# Patient Record
Sex: FEMALE | Race: WHITE | NOT HISPANIC OR LATINO | Employment: OTHER | URBAN - METROPOLITAN AREA
[De-identification: names, ages, dates, MRNs, and addresses within clinical notes are randomized per-mention and may not be internally consistent; named-entity substitution may affect disease eponyms.]

---

## 2018-01-05 ENCOUNTER — APPOINTMENT (OUTPATIENT)
Dept: WOMENS IMAGING | Facility: HOSPITAL | Age: 63
End: 2018-01-05

## 2018-01-05 PROCEDURE — 77067 SCR MAMMO BI INCL CAD: CPT | Performed by: RADIOLOGY

## 2018-01-05 PROCEDURE — 77063 BREAST TOMOSYNTHESIS BI: CPT | Performed by: RADIOLOGY

## 2019-01-11 ENCOUNTER — APPOINTMENT (OUTPATIENT)
Dept: WOMENS IMAGING | Facility: HOSPITAL | Age: 64
End: 2019-01-11

## 2019-01-11 PROCEDURE — 77063 BREAST TOMOSYNTHESIS BI: CPT | Performed by: RADIOLOGY

## 2019-01-11 PROCEDURE — 77067 SCR MAMMO BI INCL CAD: CPT | Performed by: RADIOLOGY

## 2020-01-17 ENCOUNTER — APPOINTMENT (OUTPATIENT)
Dept: WOMENS IMAGING | Facility: HOSPITAL | Age: 65
End: 2020-01-17

## 2020-01-17 PROCEDURE — 77067 SCR MAMMO BI INCL CAD: CPT | Performed by: RADIOLOGY

## 2020-01-17 PROCEDURE — 77063 BREAST TOMOSYNTHESIS BI: CPT | Performed by: RADIOLOGY

## 2020-02-04 ENCOUNTER — APPOINTMENT (OUTPATIENT)
Dept: WOMENS IMAGING | Facility: HOSPITAL | Age: 65
End: 2020-02-04

## 2020-02-04 PROCEDURE — 76641 ULTRASOUND BREAST COMPLETE: CPT | Performed by: RADIOLOGY

## 2020-02-04 PROCEDURE — 77061 BREAST TOMOSYNTHESIS UNI: CPT | Performed by: RADIOLOGY

## 2020-02-04 PROCEDURE — 77065 DX MAMMO INCL CAD UNI: CPT | Performed by: RADIOLOGY

## 2020-02-04 PROCEDURE — G0279 TOMOSYNTHESIS, MAMMO: HCPCS | Performed by: RADIOLOGY

## 2020-08-03 ENCOUNTER — APPOINTMENT (OUTPATIENT)
Dept: WOMENS IMAGING | Facility: HOSPITAL | Age: 65
End: 2020-08-03

## 2020-08-03 PROCEDURE — 77065 DX MAMMO INCL CAD UNI: CPT | Performed by: RADIOLOGY

## 2020-08-03 PROCEDURE — 76641 ULTRASOUND BREAST COMPLETE: CPT | Performed by: RADIOLOGY

## 2020-08-03 PROCEDURE — G0279 TOMOSYNTHESIS, MAMMO: HCPCS | Performed by: RADIOLOGY

## 2020-08-03 PROCEDURE — 77061 BREAST TOMOSYNTHESIS UNI: CPT | Performed by: RADIOLOGY

## 2021-02-23 ENCOUNTER — APPOINTMENT (OUTPATIENT)
Dept: WOMENS IMAGING | Facility: HOSPITAL | Age: 66
End: 2021-02-23

## 2021-02-23 PROCEDURE — 77066 DX MAMMO INCL CAD BI: CPT | Performed by: RADIOLOGY

## 2021-02-23 PROCEDURE — 77062 BREAST TOMOSYNTHESIS BI: CPT | Performed by: RADIOLOGY

## 2021-02-23 PROCEDURE — G0279 TOMOSYNTHESIS, MAMMO: HCPCS | Performed by: RADIOLOGY

## 2021-05-10 ENCOUNTER — APPOINTMENT (OUTPATIENT)
Dept: WOMENS IMAGING | Facility: HOSPITAL | Age: 66
End: 2021-05-10

## 2021-05-10 PROCEDURE — 77080 DXA BONE DENSITY AXIAL: CPT | Performed by: RADIOLOGY

## 2022-04-19 ENCOUNTER — APPOINTMENT (OUTPATIENT)
Dept: WOMENS IMAGING | Facility: HOSPITAL | Age: 67
End: 2022-04-19

## 2022-04-19 PROCEDURE — 77063 BREAST TOMOSYNTHESIS BI: CPT | Performed by: RADIOLOGY

## 2022-04-19 PROCEDURE — 77067 SCR MAMMO BI INCL CAD: CPT | Performed by: RADIOLOGY

## 2022-09-30 ENCOUNTER — PRE-ADMISSION TESTING (OUTPATIENT)
Dept: PREADMISSION TESTING | Facility: HOSPITAL | Age: 67
End: 2022-09-30

## 2022-09-30 VITALS
SYSTOLIC BLOOD PRESSURE: 139 MMHG | OXYGEN SATURATION: 99 % | RESPIRATION RATE: 16 BRPM | TEMPERATURE: 98.3 F | DIASTOLIC BLOOD PRESSURE: 68 MMHG | HEIGHT: 63 IN | WEIGHT: 167 LBS | BODY MASS INDEX: 29.59 KG/M2 | HEART RATE: 69 BPM

## 2022-09-30 LAB
ANION GAP SERPL CALCULATED.3IONS-SCNC: 10.6 MMOL/L (ref 5–15)
BUN SERPL-MCNC: 20 MG/DL (ref 8–23)
BUN/CREAT SERPL: 26.7 (ref 7–25)
CALCIUM SPEC-SCNC: 9.6 MG/DL (ref 8.6–10.5)
CHLORIDE SERPL-SCNC: 102 MMOL/L (ref 98–107)
CO2 SERPL-SCNC: 25.4 MMOL/L (ref 22–29)
CREAT SERPL-MCNC: 0.75 MG/DL (ref 0.57–1)
DEPRECATED RDW RBC AUTO: 39.9 FL (ref 37–54)
EGFRCR SERPLBLD CKD-EPI 2021: 87.9 ML/MIN/1.73
ERYTHROCYTE [DISTWIDTH] IN BLOOD BY AUTOMATED COUNT: 12.4 % (ref 12.3–15.4)
GLUCOSE SERPL-MCNC: 160 MG/DL (ref 65–99)
HCT VFR BLD AUTO: 40.4 % (ref 34–46.6)
HGB BLD-MCNC: 13.4 G/DL (ref 12–15.9)
MCH RBC QN AUTO: 29.1 PG (ref 26.6–33)
MCHC RBC AUTO-ENTMCNC: 33.2 G/DL (ref 31.5–35.7)
MCV RBC AUTO: 87.8 FL (ref 79–97)
PLATELET # BLD AUTO: 253 10*3/MM3 (ref 140–450)
PMV BLD AUTO: 10.7 FL (ref 6–12)
POTASSIUM SERPL-SCNC: 4.3 MMOL/L (ref 3.5–5.2)
RBC # BLD AUTO: 4.6 10*6/MM3 (ref 3.77–5.28)
SODIUM SERPL-SCNC: 138 MMOL/L (ref 136–145)
WBC NRBC COR # BLD: 7.78 10*3/MM3 (ref 3.4–10.8)

## 2022-09-30 PROCEDURE — 36415 COLL VENOUS BLD VENIPUNCTURE: CPT

## 2022-09-30 PROCEDURE — 80048 BASIC METABOLIC PNL TOTAL CA: CPT

## 2022-09-30 PROCEDURE — 85027 COMPLETE CBC AUTOMATED: CPT

## 2022-09-30 RX ORDER — MELOXICAM 15 MG/1
15 TABLET ORAL DAILY
COMMUNITY
End: 2022-10-04 | Stop reason: HOSPADM

## 2022-10-03 ENCOUNTER — ANESTHESIA (OUTPATIENT)
Dept: PERIOP | Facility: HOSPITAL | Age: 67
End: 2022-10-03

## 2022-10-03 ENCOUNTER — ANESTHESIA EVENT (OUTPATIENT)
Dept: PERIOP | Facility: HOSPITAL | Age: 67
End: 2022-10-03

## 2022-10-03 ENCOUNTER — HOSPITAL ENCOUNTER (OUTPATIENT)
Facility: HOSPITAL | Age: 67
Discharge: HOME | End: 2022-10-04
Attending: SURGERY | Admitting: SURGERY

## 2022-10-03 DIAGNOSIS — G89.18 POSTOPERATIVE PAIN: Primary | ICD-10-CM

## 2022-10-03 DIAGNOSIS — N62 MACROMASTIA: ICD-10-CM

## 2022-10-03 PROCEDURE — 25010000002 ROPIVACAINE PER 1 MG: Performed by: SURGERY

## 2022-10-03 PROCEDURE — 25010000002 METHYLPREDNISOLONE PER 125 MG: Performed by: SURGERY

## 2022-10-03 PROCEDURE — 25010000002 PROPOFOL 10 MG/ML EMULSION: Performed by: NURSE ANESTHETIST, CERTIFIED REGISTERED

## 2022-10-03 PROCEDURE — 25010000002 MAGNESIUM SULFATE PER 500 MG OF MAGNESIUM: Performed by: NURSE ANESTHETIST, CERTIFIED REGISTERED

## 2022-10-03 PROCEDURE — 0 BUPIVACAINE LIPOSOME 1.3 % SUSPENSION: Performed by: SURGERY

## 2022-10-03 PROCEDURE — C9290 INJ, BUPIVACAINE LIPOSOME: HCPCS | Performed by: SURGERY

## 2022-10-03 PROCEDURE — 25010000002 CEFAZOLIN PER 500 MG: Performed by: SURGERY

## 2022-10-03 PROCEDURE — 25010000002 FENTANYL CITRATE (PF) 100 MCG/2ML SOLUTION: Performed by: NURSE ANESTHETIST, CERTIFIED REGISTERED

## 2022-10-03 PROCEDURE — 88305 TISSUE EXAM BY PATHOLOGIST: CPT | Performed by: SURGERY

## 2022-10-03 PROCEDURE — 25010000002 ONDANSETRON PER 1 MG: Performed by: ANESTHESIOLOGY

## 2022-10-03 PROCEDURE — 25010000002 HYDROMORPHONE 1 MG/ML SOLUTION: Performed by: ANESTHESIOLOGY

## 2022-10-03 PROCEDURE — 25010000002 PROPOFOL 200 MG/20ML EMULSION: Performed by: NURSE ANESTHETIST, CERTIFIED REGISTERED

## 2022-10-03 PROCEDURE — 25010000002 NEOSTIGMINE 5 MG/10ML SOLUTION: Performed by: ANESTHESIOLOGY

## 2022-10-03 RX ORDER — FENTANYL CITRATE 50 UG/ML
50 INJECTION, SOLUTION INTRAMUSCULAR; INTRAVENOUS
Status: DISCONTINUED | OUTPATIENT
Start: 2022-10-03 | End: 2022-10-03 | Stop reason: HOSPADM

## 2022-10-03 RX ORDER — PROPOFOL 10 MG/ML
INJECTION, EMULSION INTRAVENOUS AS NEEDED
Status: DISCONTINUED | OUTPATIENT
Start: 2022-10-03 | End: 2022-10-03 | Stop reason: SURG

## 2022-10-03 RX ORDER — HYDRALAZINE HYDROCHLORIDE 20 MG/ML
5 INJECTION INTRAMUSCULAR; INTRAVENOUS
Status: DISCONTINUED | OUTPATIENT
Start: 2022-10-03 | End: 2022-10-03 | Stop reason: HOSPADM

## 2022-10-03 RX ORDER — SODIUM CHLORIDE 0.9 % (FLUSH) 0.9 %
3 SYRINGE (ML) INJECTION EVERY 12 HOURS SCHEDULED
Status: DISCONTINUED | OUTPATIENT
Start: 2022-10-03 | End: 2022-10-03 | Stop reason: HOSPADM

## 2022-10-03 RX ORDER — LIDOCAINE HYDROCHLORIDE 10 MG/ML
0.5 INJECTION, SOLUTION EPIDURAL; INFILTRATION; INTRACAUDAL; PERINEURAL ONCE AS NEEDED
Status: DISCONTINUED | OUTPATIENT
Start: 2022-10-03 | End: 2022-10-03 | Stop reason: HOSPADM

## 2022-10-03 RX ORDER — ONDANSETRON 2 MG/ML
4 INJECTION INTRAMUSCULAR; INTRAVENOUS EVERY 6 HOURS PRN
Status: DISCONTINUED | OUTPATIENT
Start: 2022-10-03 | End: 2022-10-04 | Stop reason: HOSPADM

## 2022-10-03 RX ORDER — OXYCODONE HYDROCHLORIDE AND ACETAMINOPHEN 5; 325 MG/1; MG/1
1 TABLET ORAL EVERY 4 HOURS PRN
Status: DISCONTINUED | OUTPATIENT
Start: 2022-10-03 | End: 2022-10-04 | Stop reason: HOSPADM

## 2022-10-03 RX ORDER — DOCUSATE SODIUM 100 MG/1
100 CAPSULE, LIQUID FILLED ORAL 2 TIMES DAILY
Status: DISCONTINUED | OUTPATIENT
Start: 2022-10-03 | End: 2022-10-04 | Stop reason: HOSPADM

## 2022-10-03 RX ORDER — HYDROMORPHONE HYDROCHLORIDE 1 MG/ML
0.25 INJECTION, SOLUTION INTRAMUSCULAR; INTRAVENOUS; SUBCUTANEOUS
Status: DISCONTINUED | OUTPATIENT
Start: 2022-10-03 | End: 2022-10-04 | Stop reason: HOSPADM

## 2022-10-03 RX ORDER — HYDROCODONE BITARTRATE AND ACETAMINOPHEN 7.5; 325 MG/1; MG/1
1 TABLET ORAL ONCE AS NEEDED
Status: DISCONTINUED | OUTPATIENT
Start: 2022-10-03 | End: 2022-10-03 | Stop reason: HOSPADM

## 2022-10-03 RX ORDER — SACCHAROMYCES BOULARDII 250 MG
500 CAPSULE ORAL 2 TIMES DAILY
Status: DISCONTINUED | OUTPATIENT
Start: 2022-10-03 | End: 2022-10-04 | Stop reason: HOSPADM

## 2022-10-03 RX ORDER — SODIUM CHLORIDE 0.9 % (FLUSH) 0.9 %
3-10 SYRINGE (ML) INJECTION AS NEEDED
Status: DISCONTINUED | OUTPATIENT
Start: 2022-10-03 | End: 2022-10-03 | Stop reason: HOSPADM

## 2022-10-03 RX ORDER — ROPIVACAINE HYDROCHLORIDE 5 MG/ML
INJECTION, SOLUTION EPIDURAL; INFILTRATION; PERINEURAL AS NEEDED
Status: DISCONTINUED | OUTPATIENT
Start: 2022-10-03 | End: 2022-10-03 | Stop reason: HOSPADM

## 2022-10-03 RX ORDER — NALOXONE HCL 0.4 MG/ML
0.2 VIAL (ML) INJECTION AS NEEDED
Status: DISCONTINUED | OUTPATIENT
Start: 2022-10-03 | End: 2022-10-03 | Stop reason: HOSPADM

## 2022-10-03 RX ORDER — CLINDAMYCIN PHOSPHATE 900 MG/50ML
900 INJECTION INTRAVENOUS ONCE
Status: COMPLETED | OUTPATIENT
Start: 2022-10-03 | End: 2022-10-03

## 2022-10-03 RX ORDER — OXYCODONE AND ACETAMINOPHEN 10; 325 MG/1; MG/1
1 TABLET ORAL EVERY 4 HOURS PRN
Status: DISCONTINUED | OUTPATIENT
Start: 2022-10-03 | End: 2022-10-04 | Stop reason: HOSPADM

## 2022-10-03 RX ORDER — ROCURONIUM BROMIDE 10 MG/ML
INJECTION, SOLUTION INTRAVENOUS AS NEEDED
Status: DISCONTINUED | OUTPATIENT
Start: 2022-10-03 | End: 2022-10-03 | Stop reason: SURG

## 2022-10-03 RX ORDER — FAMOTIDINE 10 MG/ML
20 INJECTION, SOLUTION INTRAVENOUS ONCE
Status: COMPLETED | OUTPATIENT
Start: 2022-10-03 | End: 2022-10-03

## 2022-10-03 RX ORDER — CELECOXIB 200 MG/1
400 CAPSULE ORAL DAILY
Status: DISCONTINUED | OUTPATIENT
Start: 2022-10-04 | End: 2022-10-04 | Stop reason: HOSPADM

## 2022-10-03 RX ORDER — FLUMAZENIL 0.1 MG/ML
0.2 INJECTION INTRAVENOUS AS NEEDED
Status: DISCONTINUED | OUTPATIENT
Start: 2022-10-03 | End: 2022-10-03 | Stop reason: HOSPADM

## 2022-10-03 RX ORDER — EPHEDRINE SULFATE 50 MG/ML
5 INJECTION, SOLUTION INTRAVENOUS ONCE AS NEEDED
Status: DISCONTINUED | OUTPATIENT
Start: 2022-10-03 | End: 2022-10-03 | Stop reason: HOSPADM

## 2022-10-03 RX ORDER — SODIUM CHLORIDE, SODIUM LACTATE, POTASSIUM CHLORIDE, CALCIUM CHLORIDE 600; 310; 30; 20 MG/100ML; MG/100ML; MG/100ML; MG/100ML
9 INJECTION, SOLUTION INTRAVENOUS CONTINUOUS
Status: DISCONTINUED | OUTPATIENT
Start: 2022-10-03 | End: 2022-10-04 | Stop reason: HOSPADM

## 2022-10-03 RX ORDER — POLYETHYLENE GLYCOL 3350 17 G/17G
17 POWDER, FOR SOLUTION ORAL DAILY
Status: DISCONTINUED | OUTPATIENT
Start: 2022-10-03 | End: 2022-10-04 | Stop reason: HOSPADM

## 2022-10-03 RX ORDER — ACETAMINOPHEN 325 MG/1
975 TABLET ORAL ONCE
Status: COMPLETED | OUTPATIENT
Start: 2022-10-03 | End: 2022-10-03

## 2022-10-03 RX ORDER — HYDROXYZINE PAMOATE 50 MG/1
50 CAPSULE ORAL ONCE
Status: COMPLETED | OUTPATIENT
Start: 2022-10-03 | End: 2022-10-03

## 2022-10-03 RX ORDER — OXYCODONE AND ACETAMINOPHEN 7.5; 325 MG/1; MG/1
1 TABLET ORAL EVERY 4 HOURS PRN
Status: DISCONTINUED | OUTPATIENT
Start: 2022-10-03 | End: 2022-10-03 | Stop reason: HOSPADM

## 2022-10-03 RX ORDER — OXYCODONE HYDROCHLORIDE AND ACETAMINOPHEN 5; 325 MG/1; MG/1
1 TABLET ORAL ONCE AS NEEDED
Status: DISCONTINUED | OUTPATIENT
Start: 2022-10-03 | End: 2022-10-03 | Stop reason: HOSPADM

## 2022-10-03 RX ORDER — CYCLOBENZAPRINE HCL 10 MG
5 TABLET ORAL 3 TIMES DAILY PRN
Status: DISCONTINUED | OUTPATIENT
Start: 2022-10-03 | End: 2022-10-04 | Stop reason: HOSPADM

## 2022-10-03 RX ORDER — CELECOXIB 200 MG/1
400 CAPSULE ORAL ONCE
Status: COMPLETED | OUTPATIENT
Start: 2022-10-03 | End: 2022-10-03

## 2022-10-03 RX ORDER — ONDANSETRON 2 MG/ML
4 INJECTION INTRAMUSCULAR; INTRAVENOUS ONCE AS NEEDED
Status: DISCONTINUED | OUTPATIENT
Start: 2022-10-03 | End: 2022-10-03 | Stop reason: HOSPADM

## 2022-10-03 RX ORDER — NEOSTIGMINE METHYLSULFATE 0.5 MG/ML
INJECTION, SOLUTION INTRAVENOUS AS NEEDED
Status: DISCONTINUED | OUTPATIENT
Start: 2022-10-03 | End: 2022-10-03 | Stop reason: SURG

## 2022-10-03 RX ORDER — NALOXONE HCL 0.4 MG/ML
0.1 VIAL (ML) INJECTION
Status: DISCONTINUED | OUTPATIENT
Start: 2022-10-03 | End: 2022-10-04 | Stop reason: HOSPADM

## 2022-10-03 RX ORDER — ONDANSETRON 2 MG/ML
INJECTION INTRAMUSCULAR; INTRAVENOUS AS NEEDED
Status: DISCONTINUED | OUTPATIENT
Start: 2022-10-03 | End: 2022-10-03 | Stop reason: SURG

## 2022-10-03 RX ORDER — CYCLOBENZAPRINE HCL 10 MG
5 TABLET ORAL ONCE
Status: COMPLETED | OUTPATIENT
Start: 2022-10-03 | End: 2022-10-03

## 2022-10-03 RX ORDER — PROMETHAZINE HYDROCHLORIDE 12.5 MG/1
12.5 TABLET ORAL EVERY 6 HOURS PRN
Status: DISCONTINUED | OUTPATIENT
Start: 2022-10-03 | End: 2022-10-04 | Stop reason: HOSPADM

## 2022-10-03 RX ORDER — CYCLOBENZAPRINE HCL 10 MG
10 TABLET ORAL ONCE
Status: COMPLETED | OUTPATIENT
Start: 2022-10-03 | End: 2022-10-03

## 2022-10-03 RX ORDER — GLYCOPYRROLATE 0.2 MG/ML
INJECTION INTRAMUSCULAR; INTRAVENOUS AS NEEDED
Status: DISCONTINUED | OUTPATIENT
Start: 2022-10-03 | End: 2022-10-03 | Stop reason: SURG

## 2022-10-03 RX ORDER — HYDROXYZINE PAMOATE 50 MG/1
50 CAPSULE ORAL 4 TIMES DAILY PRN
Status: DISCONTINUED | OUTPATIENT
Start: 2022-10-03 | End: 2022-10-04 | Stop reason: HOSPADM

## 2022-10-03 RX ORDER — LIDOCAINE HYDROCHLORIDE 20 MG/ML
INJECTION, SOLUTION EPIDURAL; INFILTRATION; INTRACAUDAL; PERINEURAL AS NEEDED
Status: DISCONTINUED | OUTPATIENT
Start: 2022-10-03 | End: 2022-10-03 | Stop reason: SURG

## 2022-10-03 RX ORDER — MAGNESIUM SULFATE HEPTAHYDRATE 500 MG/ML
INJECTION, SOLUTION INTRAMUSCULAR; INTRAVENOUS AS NEEDED
Status: DISCONTINUED | OUTPATIENT
Start: 2022-10-03 | End: 2022-10-03 | Stop reason: SURG

## 2022-10-03 RX ORDER — SCOLOPAMINE TRANSDERMAL SYSTEM 1 MG/1
1 PATCH, EXTENDED RELEASE TRANSDERMAL ONCE
Status: DISCONTINUED | OUTPATIENT
Start: 2022-10-03 | End: 2022-10-03

## 2022-10-03 RX ORDER — CELECOXIB 200 MG/1
200 CAPSULE ORAL ONCE
Status: COMPLETED | OUTPATIENT
Start: 2022-10-03 | End: 2022-10-03

## 2022-10-03 RX ORDER — ACETAMINOPHEN 500 MG
1000 TABLET ORAL EVERY 6 HOURS PRN
Status: DISCONTINUED | OUTPATIENT
Start: 2022-10-03 | End: 2022-10-04 | Stop reason: HOSPADM

## 2022-10-03 RX ORDER — SODIUM CHLORIDE, SODIUM LACTATE, POTASSIUM CHLORIDE, CALCIUM CHLORIDE 600; 310; 30; 20 MG/100ML; MG/100ML; MG/100ML; MG/100ML
100 INJECTION, SOLUTION INTRAVENOUS CONTINUOUS
Status: DISCONTINUED | OUTPATIENT
Start: 2022-10-03 | End: 2022-10-04 | Stop reason: HOSPADM

## 2022-10-03 RX ORDER — ACETAMINOPHEN 500 MG
1000 TABLET ORAL EVERY 6 HOURS PRN
COMMUNITY

## 2022-10-03 RX ORDER — HYDROMORPHONE HYDROCHLORIDE 1 MG/ML
0.5 INJECTION, SOLUTION INTRAMUSCULAR; INTRAVENOUS; SUBCUTANEOUS
Status: DISCONTINUED | OUTPATIENT
Start: 2022-10-03 | End: 2022-10-03 | Stop reason: HOSPADM

## 2022-10-03 RX ORDER — MIDAZOLAM HYDROCHLORIDE 1 MG/ML
0.5 INJECTION INTRAMUSCULAR; INTRAVENOUS
Status: DISCONTINUED | OUTPATIENT
Start: 2022-10-03 | End: 2022-10-03 | Stop reason: HOSPADM

## 2022-10-03 RX ORDER — LABETALOL HYDROCHLORIDE 5 MG/ML
5 INJECTION, SOLUTION INTRAVENOUS
Status: DISCONTINUED | OUTPATIENT
Start: 2022-10-03 | End: 2022-10-03 | Stop reason: HOSPADM

## 2022-10-03 RX ORDER — FENTANYL CITRATE 50 UG/ML
INJECTION, SOLUTION INTRAMUSCULAR; INTRAVENOUS AS NEEDED
Status: DISCONTINUED | OUTPATIENT
Start: 2022-10-03 | End: 2022-10-03 | Stop reason: SURG

## 2022-10-03 RX ORDER — ONDANSETRON 4 MG/1
4 TABLET, FILM COATED ORAL EVERY 6 HOURS PRN
Status: DISCONTINUED | OUTPATIENT
Start: 2022-10-03 | End: 2022-10-04 | Stop reason: HOSPADM

## 2022-10-03 RX ADMIN — SODIUM CHLORIDE, POTASSIUM CHLORIDE, SODIUM LACTATE AND CALCIUM CHLORIDE: 600; 310; 30; 20 INJECTION, SOLUTION INTRAVENOUS at 16:05

## 2022-10-03 RX ADMIN — SODIUM CHLORIDE 250 MG: 9 INJECTION, SOLUTION INTRAVENOUS at 11:27

## 2022-10-03 RX ADMIN — HYDROXYZINE PAMOATE 50 MG: 50 CAPSULE ORAL at 11:24

## 2022-10-03 RX ADMIN — NEOSTIGMINE METHYLSULFATE 2.5 MG: 0.5 INJECTION INTRAVENOUS at 15:45

## 2022-10-03 RX ADMIN — CYCLOBENZAPRINE 5 MG: 10 TABLET, FILM COATED ORAL at 16:57

## 2022-10-03 RX ADMIN — CYCLOBENZAPRINE 10 MG: 10 TABLET, FILM COATED ORAL at 11:24

## 2022-10-03 RX ADMIN — ACETAMINOPHEN 975 MG: 325 TABLET, FILM COATED ORAL at 11:24

## 2022-10-03 RX ADMIN — FENTANYL CITRATE 50 MCG: 50 INJECTION, SOLUTION INTRAMUSCULAR; INTRAVENOUS at 14:39

## 2022-10-03 RX ADMIN — LIDOCAINE HYDROCHLORIDE 60 MG: 20 INJECTION, SOLUTION EPIDURAL; INFILTRATION; INTRACAUDAL; PERINEURAL at 12:28

## 2022-10-03 RX ADMIN — FENTANYL CITRATE 50 MCG: 50 INJECTION, SOLUTION INTRAMUSCULAR; INTRAVENOUS at 12:24

## 2022-10-03 RX ADMIN — MAGNESIUM SULFATE HEPTAHYDRATE 2 G: 500 INJECTION, SOLUTION INTRAMUSCULAR; INTRAVENOUS at 12:33

## 2022-10-03 RX ADMIN — SCOPALAMINE 1 PATCH: 1 PATCH, EXTENDED RELEASE TRANSDERMAL at 11:24

## 2022-10-03 RX ADMIN — CLINDAMYCIN PHOSPHATE 900 MG: 900 INJECTION, SOLUTION INTRAVENOUS at 12:17

## 2022-10-03 RX ADMIN — FAMOTIDINE 20 MG: 10 INJECTION INTRAVENOUS at 11:24

## 2022-10-03 RX ADMIN — DOCUSATE SODIUM 100 MG: 100 CAPSULE, LIQUID FILLED ORAL at 21:12

## 2022-10-03 RX ADMIN — GLYCOPYRROLATE 0.2 MG: 1 INJECTION INTRAMUSCULAR; INTRAVENOUS at 13:41

## 2022-10-03 RX ADMIN — CELECOXIB 200 MG: 200 CAPSULE ORAL at 16:57

## 2022-10-03 RX ADMIN — PROPOFOL 25 MCG/KG/MIN: 10 INJECTION, EMULSION INTRAVENOUS at 12:44

## 2022-10-03 RX ADMIN — CELECOXIB 400 MG: 200 CAPSULE ORAL at 11:24

## 2022-10-03 RX ADMIN — PROPOFOL 200 MG: 10 INJECTION, EMULSION INTRAVENOUS at 12:28

## 2022-10-03 RX ADMIN — SODIUM CHLORIDE, POTASSIUM CHLORIDE, SODIUM LACTATE AND CALCIUM CHLORIDE: 600; 310; 30; 20 INJECTION, SOLUTION INTRAVENOUS at 13:43

## 2022-10-03 RX ADMIN — ONDANSETRON 4 MG: 2 INJECTION INTRAMUSCULAR; INTRAVENOUS at 15:48

## 2022-10-03 RX ADMIN — SODIUM CHLORIDE, POTASSIUM CHLORIDE, SODIUM LACTATE AND CALCIUM CHLORIDE 9 ML/HR: 600; 310; 30; 20 INJECTION, SOLUTION INTRAVENOUS at 11:15

## 2022-10-03 RX ADMIN — HYDROMORPHONE HYDROCHLORIDE 0.5 MG: 1 INJECTION, SOLUTION INTRAMUSCULAR; INTRAVENOUS; SUBCUTANEOUS at 15:52

## 2022-10-03 RX ADMIN — FENTANYL CITRATE 50 MCG: 50 INJECTION, SOLUTION INTRAMUSCULAR; INTRAVENOUS at 14:27

## 2022-10-03 RX ADMIN — GLYCOPYRROLATE 0.4 MG: 1 INJECTION INTRAMUSCULAR; INTRAVENOUS at 15:46

## 2022-10-03 RX ADMIN — ROCURONIUM BROMIDE 50 MG: 10 INJECTION, SOLUTION INTRAVENOUS at 12:28

## 2022-10-03 RX ADMIN — FENTANYL CITRATE 50 MCG: 50 INJECTION, SOLUTION INTRAMUSCULAR; INTRAVENOUS at 12:47

## 2022-10-03 NOTE — ANESTHESIA POSTPROCEDURE EVALUATION
Patient: Lesli Ambrocio    Procedure Summary     Date: 10/03/22 Room / Location: [facility] OR 03 / [facility] MAIN OR    Anesthesia Start: 1222 Anesthesia Stop: 1629    Procedure: BILATERAL BREAST REDUCTION (Bilateral Chest) Diagnosis:     Surgeons: CHARLEE Day MD Provider: Lauro Ramos MD    Anesthesia Type: general ASA Status: 2          Anesthesia Type: general    Vitals  Vitals Value Taken Time   /73 10/03/22 1701   Temp 36.6 °C (97.9 °F) 10/03/22 1626   Pulse 78 10/03/22 1714   Resp 16 10/03/22 1700   SpO2 97 % 10/03/22 1714   Vitals shown include unvalidated device data.        Post Anesthesia Care and Evaluation    Patient location during evaluation: bedside  Patient participation: complete - patient participated  Level of consciousness: awake and alert  Pain management: adequate    Airway patency: patent  Anesthetic complications: No anesthetic complications  PONV Status: controlled  Cardiovascular status: blood pressure returned to baseline and acceptable  Respiratory status: acceptable  Hydration status: acceptable

## 2022-10-03 NOTE — OP NOTE
Preoperative diagnosis: Bilateral macromastia  Postoperative diagnosis: Same  Procedure performed bilateral reduction mammoplasty  Surgeon: Frank Day MD  Anesthesia: General endotracheal  Estimated blood loss 50 cc  Drains x2  Complications none apparent  Indications for procedure: This patient has severe back neck and shoulder pain she is required operative intervention because of previous back pain and she has extremely large breast with macromastia which are aggravating her back neck and shoulder pain she gets intertriginous rashes shoulder grooving back neck and shoulder pain and aggravation of her back pain.  She desires reduction mammoplasty we have discussed the risk benefits and complication she is marked prior to surgery with a modified Wise pattern and an inferior central pedicle is planned she understands I cannot guarantee a particular cup size and she agrees to proceed.  Procedure: The patient's brought the operating room placed operative table supine position.  Satisfactory general tracheal anesthesia achieved without difficulty.  We injected dilute local anesthesia about the periphery of both breast lines worked several minutes she was prepped and draped sterilely and we de-epithelialized around 42 mm nipple areolar complexes and de-epithelialized inferior central pedicles.  We then elevated thick superior skin flaps are modified Wise pattern and then resected breast tissue from medially superiorly and laterally the smaller right breast resection weight was 609 g the larger left breast resection weight was 757 g excellent hemostasis was maintained with Bovie cauterization we irrigated with antibiotic containing saline we cauterized all bleeding points thoroughly placed 15 Macedonian Tre drain on each side and secured with a nylon suture we confirmed excellent hemostasis, nipple areolar circulation was excellent with the nipples being maintained on an inferior central pedicle.  We brought the  incisions together with temporary surgical staples bringing the nipple areolar complex out through a 40 mm cut out we then removed all of the staples and replaced them with deep dermal 4-0 Vicryl's throughout and then a 4-0 PDS subcuticular suture along the inframammary crease and a 5-0 PDS subcuticular suture around the nipple and vertically.  We applied Exofin skin adhesive to the skin incisions allowed this to dry needle and sponge counts were correct x2 we placed gauze at the drain exit sites nitroglycerin paste at the T juncture and nipples she tolerated the procedure well we applied ABD pads and a light tube top and a gently compressive 6 inch Ace wrap was applied for gentle compression.  She went to the recovery area in satisfactory and stable condition needle and sponge counts were correct x2.  Specimens were sent to pathology permanent specimen

## 2022-10-03 NOTE — ANESTHESIA PROCEDURE NOTES
Airway  Urgency: elective    Date/Time: 10/3/2022 12:31 PM  Airway not difficult    General Information and Staff    Patient location during procedure: OR  Anesthesiologist: Lauro Ramos MD  CRNA/CAA: Carmelita Raygoza CRNA    Indications and Patient Condition  Indications for airway management: airway protection    Preoxygenated: yes  MILS maintained throughout  Mask difficulty assessment: 2 - vent by mask + OA or adjuvant +/- NMBA    Final Airway Details  Final airway type: endotracheal airway      Successful airway: ETT  Cuffed: yes   Successful intubation technique: direct laryngoscopy  Endotracheal tube insertion site: oral  Blade: Maher  Blade size: 2  ETT size (mm): 7.0  Cormack-Lehane Classification: grade I - full view of glottis  Placement verified by: chest auscultation and capnometry   Cuff volume (mL): 8  Measured from: lips  ETT/EBT  to lips (cm): 21  Number of attempts at approach: 1  Assessment: lips, teeth, and gum same as pre-op and atraumatic intubation    Additional Comments  Pt preoxygenated, SIVI, bag mask vent, ATETI, dentition as before

## 2022-10-03 NOTE — ANESTHESIA PREPROCEDURE EVALUATION
Anesthesia Evaluation     Patient summary reviewed and Nursing notes reviewed   history of anesthetic complications: PONV  NPO Solid Status: > 8 hours  NPO Liquid Status: > 4 hours           Airway   Mallampati: II  Dental - normal exam     Pulmonary - negative pulmonary ROS and normal exam   Cardiovascular - negative cardio ROS and normal exam        Neuro/Psych- negative ROS  GI/Hepatic/Renal/Endo - negative ROS     Musculoskeletal     Abdominal    Substance History      OB/GYN          Other   arthritis,                      Anesthesia Plan    ASA 2     general     intravenous induction     Anesthetic plan, risks, benefits, and alternatives have been provided, discussed and informed consent has been obtained with: patient.        CODE STATUS:

## 2022-10-04 VITALS
SYSTOLIC BLOOD PRESSURE: 120 MMHG | TEMPERATURE: 97.3 F | OXYGEN SATURATION: 94 % | WEIGHT: 166.89 LBS | DIASTOLIC BLOOD PRESSURE: 63 MMHG | HEART RATE: 68 BPM | RESPIRATION RATE: 16 BRPM | HEIGHT: 63 IN | BODY MASS INDEX: 29.57 KG/M2

## 2022-10-04 PROBLEM — N62 MACROMASTIA: Status: RESOLVED | Noted: 2022-10-03 | Resolved: 2022-10-04

## 2022-10-04 RX ORDER — CYCLOBENZAPRINE HCL 5 MG
TABLET ORAL
Qty: 30 TABLET | Refills: 0 | Status: SHIPPED | OUTPATIENT
Start: 2022-10-04

## 2022-10-04 RX ORDER — PROMETHAZINE HYDROCHLORIDE 12.5 MG/1
12.5 TABLET ORAL EVERY 6 HOURS PRN
Qty: 15 TABLET | Refills: 0 | Status: SHIPPED | OUTPATIENT
Start: 2022-10-04

## 2022-10-04 RX ORDER — OXYCODONE AND ACETAMINOPHEN 10; 325 MG/1; MG/1
TABLET ORAL
Qty: 20 TABLET | Refills: 0 | Status: SHIPPED | OUTPATIENT
Start: 2022-10-04

## 2022-10-04 RX ADMIN — SODIUM CHLORIDE, POTASSIUM CHLORIDE, SODIUM LACTATE AND CALCIUM CHLORIDE 100 ML/HR: 600; 310; 30; 20 INJECTION, SOLUTION INTRAVENOUS at 05:00

## 2022-10-04 RX ADMIN — DOCUSATE SODIUM 100 MG: 100 CAPSULE, LIQUID FILLED ORAL at 08:04

## 2022-10-04 RX ADMIN — POLYETHYLENE GLYCOL 3350 17 G: 17 POWDER, FOR SOLUTION ORAL at 08:04

## 2022-10-04 RX ADMIN — CELECOXIB 400 MG: 200 CAPSULE ORAL at 08:04

## 2022-10-04 RX ADMIN — NITROGLYCERIN 1 INCH: 20 OINTMENT TOPICAL at 05:52

## 2022-10-04 RX ADMIN — Medication 500 MG: at 00:51

## 2022-10-04 RX ADMIN — Medication 500 MG: at 08:04

## 2022-10-04 RX ADMIN — NITROGLYCERIN 1 INCH: 20 OINTMENT TOPICAL at 00:51

## 2022-10-04 NOTE — NURSING NOTE
MAGDALENOS. ALIYA drains x 2. Drain care teaching done. Reviewed Dr. Rueda discharge instructions. Discharged home today.

## 2022-10-04 NOTE — PLAN OF CARE
Problem: Adult Inpatient Plan of Care  Goal: Plan of Care Review  Outcome: Ongoing, Progressing  Flowsheets (Taken 10/4/2022 2451)  Plan of Care Reviewed With: patient  Outcome Evaluation: received from PACU awake and alert, pain 0, incisions and dressings CDI, ace wrap removed as ordred, ALIYA x2 with small serous sanguinous drain, drain care teaching initiated, voiding, ambulated, pain level remained minimal, did not ask for any pain meds, )2 at 2 L during the night and D/C'd this am, VSS, going home today   Goal Outcome Evaluation:  Plan of Care Reviewed With: patient           Outcome Evaluation: received from PACU awake and alert, pain 0, incisions and dressings CDI, ace wrap removed as ordred, ALIYA x2 with small serous sanguinous drain, drain care teaching initiated, voiding, ambulated, pain level remained minimal, did not ask for any pain meds, )2 at 2 L during the night and D/C'd this am, VSS, going home today

## 2022-10-04 NOTE — CASE MANAGEMENT/SOCIAL WORK
Case Management Discharge Note      Final Note: Discharge to home. JOSTIN CAMPBELL CCP         Selected Continued Care - Admitted Since 10/3/2022     Destination    No services have been selected for the patient.              Durable Medical Equipment    No services have been selected for the patient.              Dialysis/Infusion    No services have been selected for the patient.              Home Medical Care    No services have been selected for the patient.              Therapy    No services have been selected for the patient.              Community Resources    No services have been selected for the patient.              Community & DME    No services have been selected for the patient.                  Transportation Services  Private: Car    Final Discharge Disposition Code: 01 - home or self-care

## 2022-10-04 NOTE — DISCHARGE SUMMARY
Assessment & Plan  Patient is postoperative day #1 from a bilateral reduction mammoplasty for a diagnosis of severe symptomatic macromastia by Frank Day MD.  She has done well and is ready for discharge home.  Her diet is regular, her activities are limited with a 5 pound weight limit she is to keep her arms and hands lower than the top of her head.  No vigorous exercise is allowed for 3 weeks.  She has been instructed in drain care her discharge medications include Percocet Flexeril and Phenergan.  She will require greater than 3 days of opioids because of major bilateral breast surgery.  Her follow-up appointment will be with Dr. Day this Friday.  I have reviewed my reduction mammoplasty discharge instructions with the patient they are scanned into epic.  She is to call or text me if she has any problems or concerns she has contact information for the office as well as my cell.  She is stable at discharge all precautions have been followed regarding the pandemic.    Subjective Patient is postoperative day #1 from a bilateral reduction mammoplasty for a diagnosis of severe symptomatic macromastia.  She has done well overnight she is voiding without difficulties she is in minimal pain tolerating oral intake    Temp:  [96.9 °F (36.1 °C)-97.9 °F (36.6 °C)] 96.9 °F (36.1 °C)  Heart Rate:  [60-90] 60  Resp:  [14-18] 18  BP: (115-166)/(60-94) 117/63  I/O last 3 completed shifts:  In: 3336.7 [P.O.:340; I.V.:2946.7; IV Piggyback:50]  Out: 3562 [Urine:3450; Drains:62; Blood:50]  No intake/output data recorded.    Objective Operative sites look good skin edges mild bruising no evidence of circulatory compromise breast are soft no evidence of collection or hematoma nipple areolar circulation is excellent drainage is minimal calves are soft and nontender    * No active hospital problems. *

## 2022-10-05 LAB
LAB AP CASE REPORT: NORMAL
PATH REPORT.FINAL DX SPEC: NORMAL
PATH REPORT.GROSS SPEC: NORMAL

## 2022-10-06 NOTE — H&P
Assessment & Plan This patient has severe symptomatic macromastia we plan bilateral reduction mammoplasty I discussed the risk benefits and complications with her she is reviewed the informed consent from the [employer] in my office and is ready to proceed she is marked prior to surgery with a modified Stout pattern with an inferior central pedicle planned.  I have answered all of her questions she is very ready to proceed.  She understands I cannot guarantee a particular cup size.    Subjective This patient's chief complaint is severe symptomatic macromastia  History of present illness this patient has long suffered with severe symptomatic macromastia she is generally healthy but does have arthritis and has had some hand surgery as well as a disc fusion by Dr. Lauro Osuna she takes occasional Aleve she has been fully vaccinated for COVID she is a non-smoker and is not exposed to secondhand smoke.  She has tried acupuncture physical therapy hot packs cold packs supportive bras nonsteroidal anti-inflammatory drugs she is tried to avoid opioids because they make her sick she complains of severe grooving in her shoulders and has very little soft tissue between the skin and soft tissue of her shoulders and the bones she has had an AC separation in the past she has chronic and severe low back pain mid back pain and upper back pain worsening over the last 20 years it does interfere with exercise  Medications see epic  Allergies no known drug allergies  Social nonsmoker       I/O last 3 completed shifts:  In: 3336.7 [P.O.:340; I.V.:2946.7; IV Piggyback:50]  Out: 3562 [Urine:3450; Drains:62; Blood:50]  I/O this shift:  In: 444.4 [I.V.:444.4]  Out: 515 [Urine:500; Drains:15]    Objective Physical exam vital signs please see epic  Chest is clear  Heart regular rate without murmurs rubs or gallops  Breast are extremely large without any dominant masses left breast larger than right sternal notch to  nipple distance is 33 on the right 36 cm on the left nipple to crease distance is 19 cm on both sides we would estimate removal of somewhere between 600 to 1200 g of tissue per breast  Extremities no cyanosis clubbing edema calves soft and nontender      * No active hospital problems. *

## 2023-04-21 ENCOUNTER — APPOINTMENT (OUTPATIENT)
Dept: WOMENS IMAGING | Facility: HOSPITAL | Age: 68
End: 2023-04-21
Payer: MEDICARE

## 2023-04-21 PROCEDURE — 77067 SCR MAMMO BI INCL CAD: CPT | Performed by: RADIOLOGY

## 2023-04-21 PROCEDURE — 77063 BREAST TOMOSYNTHESIS BI: CPT | Performed by: RADIOLOGY

## 2023-07-17 ENCOUNTER — HOSPITAL ENCOUNTER (OUTPATIENT)
Facility: HOSPITAL | Age: 68
Setting detail: HOSPITAL OUTPATIENT SURGERY
Discharge: HOME | End: 2023-07-17
Attending: SURGERY | Admitting: SURGERY
Payer: MEDICARE

## 2023-07-17 VITALS
HEART RATE: 91 BPM | TEMPERATURE: 97.6 F | OXYGEN SATURATION: 95 % | SYSTOLIC BLOOD PRESSURE: 162 MMHG | DIASTOLIC BLOOD PRESSURE: 71 MMHG | RESPIRATION RATE: 16 BRPM

## 2023-07-17 PROCEDURE — 0 CLINDAMYCIN 900 MG/50ML SOLUTION: Performed by: SURGERY

## 2023-07-17 PROCEDURE — 25010000002 METHYLPREDNISOLONE PER 125 MG: Performed by: SURGERY

## 2023-07-17 RX ORDER — LIDOCAINE HYDROCHLORIDE 10 MG/ML
0.5 INJECTION, SOLUTION EPIDURAL; INFILTRATION; INTRACAUDAL; PERINEURAL ONCE AS NEEDED
Status: DISCONTINUED | OUTPATIENT
Start: 2023-07-17 | End: 2023-07-17 | Stop reason: HOSPADM

## 2023-07-17 RX ORDER — FLUMAZENIL 0.1 MG/ML
0.2 INJECTION INTRAVENOUS AS NEEDED
Status: DISCONTINUED | OUTPATIENT
Start: 2023-07-17 | End: 2023-07-17 | Stop reason: HOSPADM

## 2023-07-17 RX ORDER — SODIUM CHLORIDE, SODIUM LACTATE, POTASSIUM CHLORIDE, CALCIUM CHLORIDE 600; 310; 30; 20 MG/100ML; MG/100ML; MG/100ML; MG/100ML
9 INJECTION, SOLUTION INTRAVENOUS CONTINUOUS
Status: DISCONTINUED | OUTPATIENT
Start: 2023-07-17 | End: 2023-07-17 | Stop reason: HOSPADM

## 2023-07-17 RX ORDER — HYDROMORPHONE HYDROCHLORIDE 1 MG/ML
0.25 INJECTION, SOLUTION INTRAMUSCULAR; INTRAVENOUS; SUBCUTANEOUS
Status: DISCONTINUED | OUTPATIENT
Start: 2023-07-17 | End: 2023-07-17 | Stop reason: HOSPADM

## 2023-07-17 RX ORDER — NALOXONE HCL 0.4 MG/ML
0.2 VIAL (ML) INJECTION AS NEEDED
Status: DISCONTINUED | OUTPATIENT
Start: 2023-07-17 | End: 2023-07-17 | Stop reason: HOSPADM

## 2023-07-17 RX ORDER — PROMETHAZINE HYDROCHLORIDE 25 MG/1
25 TABLET ORAL ONCE AS NEEDED
Status: DISCONTINUED | OUTPATIENT
Start: 2023-07-17 | End: 2023-07-17 | Stop reason: HOSPADM

## 2023-07-17 RX ORDER — CELECOXIB 200 MG/1
400 CAPSULE ORAL ONCE
Status: COMPLETED | OUTPATIENT
Start: 2023-07-17 | End: 2023-07-17

## 2023-07-17 RX ORDER — SCOLOPAMINE TRANSDERMAL SYSTEM 1 MG/1
1 PATCH, EXTENDED RELEASE TRANSDERMAL ONCE
Status: DISCONTINUED | OUTPATIENT
Start: 2023-07-17 | End: 2023-07-17 | Stop reason: HOSPADM

## 2023-07-17 RX ORDER — CLINDAMYCIN PHOSPHATE 900 MG/50ML
900 INJECTION INTRAVENOUS ONCE
Status: COMPLETED | OUTPATIENT
Start: 2023-07-17 | End: 2023-07-17

## 2023-07-17 RX ORDER — ERYTHROMYCIN 5 MG/G
OINTMENT OPHTHALMIC AS NEEDED
Status: DISCONTINUED | OUTPATIENT
Start: 2023-07-17 | End: 2023-07-17 | Stop reason: HOSPADM

## 2023-07-17 RX ORDER — ACETAMINOPHEN 325 MG/1
975 TABLET ORAL ONCE
Status: COMPLETED | OUTPATIENT
Start: 2023-07-17 | End: 2023-07-17

## 2023-07-17 RX ORDER — FENTANYL CITRATE 50 UG/ML
25 INJECTION, SOLUTION INTRAMUSCULAR; INTRAVENOUS
Status: DISCONTINUED | OUTPATIENT
Start: 2023-07-17 | End: 2023-07-17 | Stop reason: HOSPADM

## 2023-07-17 RX ORDER — MAGNESIUM HYDROXIDE 1200 MG/15ML
LIQUID ORAL AS NEEDED
Status: DISCONTINUED | OUTPATIENT
Start: 2023-07-17 | End: 2023-07-17 | Stop reason: HOSPADM

## 2023-07-17 RX ORDER — ONDANSETRON 4 MG/1
4 TABLET, FILM COATED ORAL ONCE AS NEEDED
Status: DISCONTINUED | OUTPATIENT
Start: 2023-07-17 | End: 2023-07-17 | Stop reason: HOSPADM

## 2023-07-17 RX ORDER — HYDRALAZINE HYDROCHLORIDE 20 MG/ML
5 INJECTION INTRAMUSCULAR; INTRAVENOUS
Status: DISCONTINUED | OUTPATIENT
Start: 2023-07-17 | End: 2023-07-17 | Stop reason: HOSPADM

## 2023-07-17 RX ORDER — DROPERIDOL 2.5 MG/ML
0.62 INJECTION, SOLUTION INTRAMUSCULAR; INTRAVENOUS
Status: DISCONTINUED | OUTPATIENT
Start: 2023-07-17 | End: 2023-07-17 | Stop reason: HOSPADM

## 2023-07-17 RX ORDER — HYDROCODONE BITARTRATE AND ACETAMINOPHEN 5; 325 MG/1; MG/1
1 TABLET ORAL ONCE AS NEEDED
Status: DISCONTINUED | OUTPATIENT
Start: 2023-07-17 | End: 2023-07-17 | Stop reason: HOSPADM

## 2023-07-17 RX ORDER — ONDANSETRON 2 MG/ML
4 INJECTION INTRAMUSCULAR; INTRAVENOUS ONCE AS NEEDED
Status: DISCONTINUED | OUTPATIENT
Start: 2023-07-17 | End: 2023-07-17 | Stop reason: HOSPADM

## 2023-07-17 RX ORDER — FENTANYL CITRATE 50 UG/ML
50 INJECTION, SOLUTION INTRAMUSCULAR; INTRAVENOUS
Status: DISCONTINUED | OUTPATIENT
Start: 2023-07-17 | End: 2023-07-17 | Stop reason: HOSPADM

## 2023-07-17 RX ORDER — MIDAZOLAM HYDROCHLORIDE 1 MG/ML
0.5 INJECTION INTRAMUSCULAR; INTRAVENOUS
Status: DISCONTINUED | OUTPATIENT
Start: 2023-07-17 | End: 2023-07-17 | Stop reason: HOSPADM

## 2023-07-17 RX ORDER — BALANCED SALT SOLUTION 6.4; .75; .48; .3; 3.9; 1.7 MG/ML; MG/ML; MG/ML; MG/ML; MG/ML; MG/ML
SOLUTION OPHTHALMIC AS NEEDED
Status: DISCONTINUED | OUTPATIENT
Start: 2023-07-17 | End: 2023-07-17 | Stop reason: HOSPADM

## 2023-07-17 RX ORDER — FAMOTIDINE 10 MG/ML
20 INJECTION, SOLUTION INTRAVENOUS ONCE
Status: COMPLETED | OUTPATIENT
Start: 2023-07-17 | End: 2023-07-17

## 2023-07-17 RX ORDER — OXYCODONE HYDROCHLORIDE AND ACETAMINOPHEN 5; 325 MG/1; MG/1
1 TABLET ORAL ONCE AS NEEDED
Status: DISCONTINUED | OUTPATIENT
Start: 2023-07-17 | End: 2023-07-17 | Stop reason: HOSPADM

## 2023-07-17 RX ORDER — DOCUSATE SODIUM 100 MG/1
100 CAPSULE, LIQUID FILLED ORAL ONCE
Status: COMPLETED | OUTPATIENT
Start: 2023-07-17 | End: 2023-07-17

## 2023-07-17 RX ORDER — IPRATROPIUM BROMIDE AND ALBUTEROL SULFATE 2.5; .5 MG/3ML; MG/3ML
3 SOLUTION RESPIRATORY (INHALATION) ONCE AS NEEDED
Status: DISCONTINUED | OUTPATIENT
Start: 2023-07-17 | End: 2023-07-17 | Stop reason: HOSPADM

## 2023-07-17 RX ORDER — OXYCODONE AND ACETAMINOPHEN 10; 325 MG/1; MG/1
1 TABLET ORAL EVERY 6 HOURS PRN
Qty: 15 TABLET | Refills: 0 | Status: SHIPPED | OUTPATIENT
Start: 2023-07-17

## 2023-07-17 RX ORDER — EPHEDRINE SULFATE 50 MG/ML
5 INJECTION, SOLUTION INTRAVENOUS ONCE AS NEEDED
Status: DISCONTINUED | OUTPATIENT
Start: 2023-07-17 | End: 2023-07-17 | Stop reason: HOSPADM

## 2023-07-17 RX ORDER — SODIUM CHLORIDE 0.9 % (FLUSH) 0.9 %
3-10 SYRINGE (ML) INJECTION AS NEEDED
Status: DISCONTINUED | OUTPATIENT
Start: 2023-07-17 | End: 2023-07-17 | Stop reason: HOSPADM

## 2023-07-17 RX ORDER — PROMETHAZINE HYDROCHLORIDE 25 MG/1
25 TABLET ORAL EVERY 6 HOURS PRN
Qty: 15 TABLET | Refills: 0 | Status: SHIPPED | OUTPATIENT
Start: 2023-07-17

## 2023-07-17 RX ORDER — HYDROCODONE BITARTRATE AND ACETAMINOPHEN 7.5; 325 MG/1; MG/1
1 TABLET ORAL EVERY 4 HOURS PRN
Status: DISCONTINUED | OUTPATIENT
Start: 2023-07-17 | End: 2023-07-17 | Stop reason: HOSPADM

## 2023-07-17 RX ORDER — DIPHENHYDRAMINE HYDROCHLORIDE 50 MG/ML
12.5 INJECTION INTRAMUSCULAR; INTRAVENOUS
Status: DISCONTINUED | OUTPATIENT
Start: 2023-07-17 | End: 2023-07-17 | Stop reason: HOSPADM

## 2023-07-17 RX ORDER — PROMETHAZINE HYDROCHLORIDE 25 MG/1
12.5 TABLET ORAL ONCE AS NEEDED
Status: DISCONTINUED | OUTPATIENT
Start: 2023-07-17 | End: 2023-07-17 | Stop reason: HOSPADM

## 2023-07-17 RX ORDER — PROMETHAZINE HYDROCHLORIDE 25 MG/1
25 SUPPOSITORY RECTAL ONCE AS NEEDED
Status: DISCONTINUED | OUTPATIENT
Start: 2023-07-17 | End: 2023-07-17 | Stop reason: HOSPADM

## 2023-07-17 RX ORDER — SODIUM CHLORIDE 0.9 % (FLUSH) 0.9 %
3 SYRINGE (ML) INJECTION EVERY 12 HOURS SCHEDULED
Status: DISCONTINUED | OUTPATIENT
Start: 2023-07-17 | End: 2023-07-17 | Stop reason: HOSPADM

## 2023-07-17 RX ORDER — HYDROXYZINE PAMOATE 50 MG/1
50 CAPSULE ORAL ONCE
Status: COMPLETED | OUTPATIENT
Start: 2023-07-17 | End: 2023-07-17

## 2023-07-17 RX ORDER — LABETALOL HYDROCHLORIDE 5 MG/ML
5 INJECTION, SOLUTION INTRAVENOUS
Status: DISCONTINUED | OUTPATIENT
Start: 2023-07-17 | End: 2023-07-17 | Stop reason: HOSPADM

## 2023-07-17 RX ADMIN — SCOPALAMINE 1 PATCH: 1 PATCH, EXTENDED RELEASE TRANSDERMAL at 12:12

## 2023-07-17 RX ADMIN — DOCUSATE SODIUM 100 MG: 100 CAPSULE, LIQUID FILLED ORAL at 14:21

## 2023-07-17 RX ADMIN — CELECOXIB 400 MG: 200 CAPSULE ORAL at 12:12

## 2023-07-17 RX ADMIN — ACETAMINOPHEN 975 MG: 325 TABLET, FILM COATED ORAL at 12:12

## 2023-07-17 RX ADMIN — CLINDAMYCIN PHOSPHATE 900 MG: 900 INJECTION, SOLUTION INTRAVENOUS at 12:31

## 2023-07-17 RX ADMIN — FAMOTIDINE 20 MG: 10 INJECTION INTRAVENOUS at 12:26

## 2023-07-17 RX ADMIN — HYDROXYZINE PAMOATE 50 MG: 50 CAPSULE ORAL at 12:12

## 2023-07-17 RX ADMIN — SODIUM CHLORIDE, POTASSIUM CHLORIDE, SODIUM LACTATE AND CALCIUM CHLORIDE 9 ML/HR: 600; 310; 30; 20 INJECTION, SOLUTION INTRAVENOUS at 12:28

## 2023-07-17 NOTE — OP NOTE
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  We plan bilateral upper lid blepharoplasty she is reviewed the informed consent from the [employer] and is ready to proceed she is marked prior to surgery.  Procedure: Patient's brought the operating room placed operatively supine position with the head elevated satisfactory general laryngeal mask anesthesia was achieved without difficulty we injected 1% lidocaine 1 100,000 epinephrine 7 cc mixed with 3 cc of Marcaine with epinephrine quarter percent this was allowed to work for full 15 minutes while she was prepped prepped and draped in a sterile fashion we placed corneal scleral shields in with Lacri-Lube on each side carefully we then resected the skin per our preoperative markings taking some of the orbicularis muscularis just beneath it we open the orbital septum medially and removed just a bit of fat on each side obtained excellent hemostasis with the needle tip Bovie we closed the most lateral aspect of our incision with a 5-0 Monocryl laterally we then used 5-0 Prolene subcuticular pullout suture we also placed a few 5-0 fast-absorbing gut laterally we secured our suture ends on the pullout sutures with Steri-Strips and Mastisol we removed the scleral shields irrigated with BSS solution applied erythromycin ophthalmic to our incisions and then a cool  moistened 4 x 4 gauze applied to the upper lids and then an ice pack she tolerated procedure well needle and sponge counts were correct x2 and she went to recovery in satisfactory condition.

## 2023-09-12 ENCOUNTER — TELEPHONE (OUTPATIENT)
Dept: SURGERY | Facility: CLINIC | Age: 68
End: 2023-09-12

## 2023-09-12 NOTE — TELEPHONE ENCOUNTER
[facility] received a fax that requires your attention. The document has been indexed to the patient’s chart for your review.      Reason for sending:  [facility] PHYSICIAN LETTER, REQUIRES PROVIDERS REVIEW.     Documents Description: PHYSICIAN LETTER_TAMMIE ADAMS MD_09-12-23    Name of Sender: TAMMIE ADAMS MD    Date Indexed: 09/12/23    Notes (if needed):

## 2024-01-29 ENCOUNTER — PREP FOR SURGERY (OUTPATIENT)
Dept: OTHER | Facility: HOSPITAL | Age: 69
End: 2024-01-29
Payer: MEDICARE

## 2024-01-29 DIAGNOSIS — Z80.0 FAMILY HISTORY OF COLON CANCER: Primary | ICD-10-CM

## 2024-02-19 ENCOUNTER — TELEPHONE (OUTPATIENT)
Dept: SURGERY | Facility: CLINIC | Age: 69
End: 2024-02-19
Payer: MEDICARE

## 2024-02-19 NOTE — TELEPHONE ENCOUNTER
Returned patient's call regarding scheduling her direct colonoscopy. She had questions regarding her last scope at [facility]. I have sent Dr. Young a message and will wait to hear back from him.

## 2024-02-22 PROBLEM — Z80.0 FAMILY HISTORY OF COLON CANCER: Status: ACTIVE | Noted: 2024-01-29

## 2024-05-22 ENCOUNTER — HOSPITAL ENCOUNTER (OUTPATIENT)
Dept: PET IMAGING | Facility: HOSPITAL | Age: 69
Discharge: HOME | End: 2024-05-22
Payer: MEDICARE

## 2024-05-22 ENCOUNTER — TRANSCRIBE ORDERS (OUTPATIENT)
Dept: BONE DENSITY | Facility: HOSPITAL | Age: 69
End: 2024-05-22
Payer: MEDICARE

## 2024-05-22 ENCOUNTER — APPOINTMENT (OUTPATIENT)
Dept: WOMENS IMAGING | Facility: HOSPITAL | Age: 69
End: 2024-05-22
Payer: MEDICARE

## 2024-05-22 DIAGNOSIS — Z78.0 POSTMENOPAUSAL STATUS (AGE-RELATED) (NATURAL): Primary | ICD-10-CM

## 2024-05-22 PROCEDURE — 77063 BREAST TOMOSYNTHESIS BI: CPT | Performed by: RADIOLOGY

## 2024-05-22 PROCEDURE — 77067 SCR MAMMO BI INCL CAD: CPT | Performed by: RADIOLOGY

## 2024-05-22 PROCEDURE — 77080 DXA BONE DENSITY AXIAL: CPT

## 2024-05-28 ENCOUNTER — ANESTHESIA EVENT (OUTPATIENT)
Dept: GASTROENTEROLOGY | Facility: HOSPITAL | Age: 69
End: 2024-05-28
Payer: MEDICARE

## 2024-05-28 RX ORDER — MELOXICAM 15 MG/1
15 TABLET ORAL DAILY
COMMUNITY
Start: 2024-03-18

## 2024-05-28 RX ORDER — MELATONIN
1000 DAILY
COMMUNITY

## 2024-05-29 ENCOUNTER — ANESTHESIA (OUTPATIENT)
Dept: GASTROENTEROLOGY | Facility: HOSPITAL | Age: 69
End: 2024-05-29
Payer: MEDICARE

## 2024-05-29 ENCOUNTER — HOSPITAL ENCOUNTER (OUTPATIENT)
Facility: HOSPITAL | Age: 69
Setting detail: HOSPITAL OUTPATIENT SURGERY
Discharge: HOME | End: 2024-05-29
Attending: SURGERY | Admitting: SURGERY
Payer: MEDICARE

## 2024-05-29 VITALS
SYSTOLIC BLOOD PRESSURE: 150 MMHG | RESPIRATION RATE: 13 BRPM | HEIGHT: 63 IN | BODY MASS INDEX: 27.14 KG/M2 | OXYGEN SATURATION: 100 % | HEART RATE: 57 BPM | DIASTOLIC BLOOD PRESSURE: 74 MMHG | WEIGHT: 153.2 LBS

## 2024-05-29 PROCEDURE — 25010000002 PROPOFOL 1000 MG/100ML EMULSION

## 2024-05-29 PROCEDURE — 25810000003 LACTATED RINGERS PER 1000 ML: Performed by: SURGERY

## 2024-05-29 PROCEDURE — 25010000002 PROPOFOL 200 MG/20ML EMULSION

## 2024-05-29 RX ORDER — PROPOFOL 10 MG/ML
INJECTION, EMULSION INTRAVENOUS AS NEEDED
Status: DISCONTINUED | OUTPATIENT
Start: 2024-05-29 | End: 2024-05-29 | Stop reason: SURG

## 2024-05-29 RX ORDER — SODIUM CHLORIDE, SODIUM LACTATE, POTASSIUM CHLORIDE, CALCIUM CHLORIDE 600; 310; 30; 20 MG/100ML; MG/100ML; MG/100ML; MG/100ML
30 INJECTION, SOLUTION INTRAVENOUS CONTINUOUS PRN
Status: DISCONTINUED | OUTPATIENT
Start: 2024-05-29 | End: 2024-05-29 | Stop reason: HOSPADM

## 2024-05-29 RX ORDER — PROPOFOL 10 MG/ML
INJECTION, EMULSION INTRAVENOUS CONTINUOUS PRN
Status: DISCONTINUED | OUTPATIENT
Start: 2024-05-29 | End: 2024-05-29 | Stop reason: SURG

## 2024-05-29 RX ORDER — LIDOCAINE HYDROCHLORIDE 20 MG/ML
INJECTION, SOLUTION INFILTRATION; PERINEURAL AS NEEDED
Status: DISCONTINUED | OUTPATIENT
Start: 2024-05-29 | End: 2024-05-29 | Stop reason: SURG

## 2024-05-29 RX ADMIN — PROPOFOL INJECTABLE EMULSION 50 MG: 10 INJECTION, EMULSION INTRAVENOUS at 07:20

## 2024-05-29 RX ADMIN — PROPOFOL INJECTABLE EMULSION 100 MG: 10 INJECTION, EMULSION INTRAVENOUS at 07:19

## 2024-05-29 RX ADMIN — SODIUM CHLORIDE, POTASSIUM CHLORIDE, SODIUM LACTATE AND CALCIUM CHLORIDE 30 ML/HR: 600; 310; 30; 20 INJECTION, SOLUTION INTRAVENOUS at 06:42

## 2024-05-29 RX ADMIN — PROPOFOL 200 MCG/KG/MIN: 10 INJECTION, EMULSION INTRAVENOUS at 07:19

## 2024-05-29 RX ADMIN — LIDOCAINE HYDROCHLORIDE 60 MG: 20 INJECTION, SOLUTION INFILTRATION; PERINEURAL at 07:19

## 2024-05-29 NOTE — H&P
CC: Family history of colon cancer    HPI: 69-year-old lady with family history of colon cancer in mother presents for surveillance colonoscopy.  Her last colonoscopy was a little bit more than 5 years ago and was unsuccessful, followed with barium enema.    PMH, PSH, MEDS AND ALLERGIES reviewed and reconciled in  EPIC    PHYSICAL EXAM:  Constitutional:  awake, alert, no acute distress  VS: afebrile, VSS  Respiratory:  normal inspiratory effort  Cardiovascular: regular rate  Gastrointestinal: Soft    ROS:  relevant systems negative other than any presenting complaints    ASSESSMENT:    Surveillance colonoscopy secondary to family history of colon cancer    PLAN:  Colonoscopy    Shar Young M.D.

## 2024-05-29 NOTE — DISCHARGE INSTRUCTIONS
For the next 24 hours patient needs to be with a responsible adult.    For 24 hours DO NOT drive, operate machinery, appliances, drink alcohol, make important decisions or sign legal documents.    Start with a light or bland diet if you are feeling sick to your stomach otherwise advance to regular diet as tolerated.    Follow recommendations on procedure report if provided by your doctor.    Call Dr Young for problems [phone]    Problems may include but not limited to: large amounts of bleeding, trouble breathing, repeated vomiting, severe unrelieved pain, fever or chills.

## 2024-05-29 NOTE — ANESTHESIA PREPROCEDURE EVALUATION
Anesthesia Evaluation     Patient summary reviewed and Nursing notes reviewed   history of anesthetic complications:  PONV               Airway   Mallampati: II  Dental      Pulmonary - negative pulmonary ROS   Cardiovascular - negative cardio ROS    ECG reviewed  Rhythm: regular  Rate: normal        Neuro/Psych- negative ROS  GI/Hepatic/Renal/Endo - negative ROS     Musculoskeletal     (+) back pain  Abdominal    Substance History - negative use     OB/GYN negative ob/gyn ROS         Other                    Anesthesia Plan    ASA 2     MAC     intravenous induction     Anesthetic plan, risks, benefits, and alternatives have been provided, discussed and informed consent has been obtained with: patient.    CODE STATUS:

## 2024-05-29 NOTE — ANESTHESIA POSTPROCEDURE EVALUATION
Patient: Lesli Ambrocio    Procedure Summary       Date: 05/29/24 Room / Location: [facility] ENDOSCOPY 1 / [facility] ENDOSCOPY    Anesthesia Start: 0716 Anesthesia Stop: 0749    Procedure: COLONOSCOPY to cecum Diagnosis:       Family history of colon cancer      (Family history of colon cancer [Z80.0])    Surgeons: Shar Young MD Provider: Sreedhar Diaz MD    Anesthesia Type: MAC ASA Status: 2            Anesthesia Type: MAC    Vitals  Vitals Value Taken Time   /52 05/29/24 0749   Temp     Pulse 69 05/29/24 0759   Resp 15 05/29/24 0748   SpO2 98 % 05/29/24 0759   Vitals shown include unfiled device data.        Post Anesthesia Care and Evaluation    Patient location during evaluation: PACU  Patient participation: complete - patient participated  Level of consciousness: awake and alert  Pain management: adequate    Airway patency: patent  Anesthetic complications: No anesthetic complications    Cardiovascular status: acceptable  Respiratory status: acceptable  Hydration status: acceptable    Comments: --------------------            05/29/24               0748     --------------------   BP:       112/52     Pulse:               Resp:       15       SpO2:               --------------------

## 2024-05-29 NOTE — OP NOTE
PREOPERATIVE DIAGNOSIS:  High risk screening secondary to family history of colon cancer-mother    POSTOPERATIVE DIAGNOSIS AND FINDINGS:  Diverticulosis    PROCEDURE:  Colonoscopy to cecum     SURGEON:  Shar Young MD    ANESTHESIA:  MAC    SPECIMEN(S):  none    DESCRIPTION:  In decubitus position digital rectal exam was normal. Colonoscope inserted under direct visualization of lumen to cecum confirmed by visualization of ileocecal valve and appendiceal orifice.  Scope was slowly withdrawn circumferentially examining all mucosal surfaces.  Bowel preparation was good.  There were no mucosal abnormalities.  Diverticulosis was present mostly in the sigmoid colon.  Tolerated well.    RECOMMENDATION FOR FUTURE SURVEILLANCE:  5 years    Shar Young M.D.

## 2024-09-13 ENCOUNTER — HOSPITAL ENCOUNTER (OUTPATIENT)
Dept: GENERAL RADIOLOGY | Facility: HOSPITAL | Age: 69
Discharge: HOME | End: 2024-09-13
Payer: MEDICARE

## 2024-09-13 ENCOUNTER — LAB (OUTPATIENT)
Dept: LAB | Facility: HOSPITAL | Age: 69
End: 2024-09-13
Payer: MEDICARE

## 2024-09-13 ENCOUNTER — HOSPITAL ENCOUNTER (OUTPATIENT)
Dept: CARDIOLOGY | Facility: HOSPITAL | Age: 69
Discharge: HOME | End: 2024-09-13
Payer: MEDICARE

## 2024-09-13 LAB
ABO GROUP BLD: NORMAL
ALBUMIN SERPL-MCNC: 4.1 G/DL (ref 3.5–5.2)
ALBUMIN/GLOB SERPL: 1.5 G/DL
ALP SERPL-CCNC: 73 U/L (ref 39–117)
ALT SERPL W P-5'-P-CCNC: 21 U/L (ref 1–33)
ANION GAP SERPL CALCULATED.3IONS-SCNC: 8.8 MMOL/L (ref 5–15)
APTT PPP: 27.6 SECONDS (ref 24–31)
AST SERPL-CCNC: 21 U/L (ref 1–32)
BILIRUB SERPL-MCNC: 0.3 MG/DL (ref 0–1.2)
BILIRUB UR QL STRIP: NEGATIVE
BLD GP AB SCN SERPL QL: NEGATIVE
BUN SERPL-MCNC: 17 MG/DL (ref 8–23)
BUN/CREAT SERPL: 23.6 (ref 7–25)
CALCIUM SPEC-SCNC: 9.4 MG/DL (ref 8.6–10.5)
CHLORIDE SERPL-SCNC: 102 MMOL/L (ref 98–107)
CLARITY UR: CLEAR
CO2 SERPL-SCNC: 25.2 MMOL/L (ref 22–29)
COLOR UR: YELLOW
CREAT SERPL-MCNC: 0.72 MG/DL (ref 0.57–1)
DEPRECATED RDW RBC AUTO: 44.9 FL (ref 37–54)
EGFRCR SERPLBLD CKD-EPI 2021: 91.2 ML/MIN/1.73
ERYTHROCYTE [DISTWIDTH] IN BLOOD BY AUTOMATED COUNT: 13 % (ref 12.3–15.4)
GLOBULIN UR ELPH-MCNC: 2.7 GM/DL
GLUCOSE SERPL-MCNC: 93 MG/DL (ref 65–99)
GLUCOSE UR STRIP-MCNC: NEGATIVE MG/DL
HBA1C MFR BLD: 5.7 % (ref 4.8–5.6)
HCT VFR BLD AUTO: 41.8 % (ref 34–46.6)
HGB BLD-MCNC: 13.4 G/DL (ref 12–15.9)
HGB UR QL STRIP.AUTO: NEGATIVE
HOLD SPECIMEN: NORMAL
INR PPP: 0.94 (ref 0.93–1.1)
KETONES UR QL STRIP: NEGATIVE
LEUKOCYTE ESTERASE UR QL STRIP.AUTO: NEGATIVE
MCH RBC QN AUTO: 30.2 PG (ref 26.6–33)
MCHC RBC AUTO-ENTMCNC: 32.1 G/DL (ref 31.5–35.7)
MCV RBC AUTO: 94.4 FL (ref 79–97)
MRSA DNA SPEC QL NAA+PROBE: NORMAL
NITRITE UR QL STRIP: NEGATIVE
PH UR STRIP.AUTO: 6 [PH] (ref 5–8)
PLATELET # BLD AUTO: 244 10*3/MM3 (ref 140–450)
PMV BLD AUTO: 10.3 FL (ref 6–12)
POTASSIUM SERPL-SCNC: 4.8 MMOL/L (ref 3.5–5.2)
PROT SERPL-MCNC: 6.8 G/DL (ref 6–8.5)
PROT UR QL STRIP: NEGATIVE
PROTHROMBIN TIME: 10.3 SECONDS (ref 9.6–11.7)
QT INTERVAL: 437 MS
QTC INTERVAL: 414 MS
RBC # BLD AUTO: 4.43 10*6/MM3 (ref 3.77–5.28)
RH BLD: POSITIVE
SODIUM SERPL-SCNC: 136 MMOL/L (ref 136–145)
SP GR UR STRIP: 1.01 (ref 1–1.03)
T&S EXPIRATION DATE: NORMAL
UROBILINOGEN UR QL STRIP: NORMAL
WBC NRBC COR # BLD AUTO: 7.52 10*3/MM3 (ref 3.4–10.8)

## 2024-09-13 PROCEDURE — 86900 BLOOD TYPING SEROLOGIC ABO: CPT

## 2024-09-13 PROCEDURE — 86850 RBC ANTIBODY SCREEN: CPT

## 2024-09-13 PROCEDURE — 93005 ELECTROCARDIOGRAM TRACING: CPT | Performed by: ORTHOPAEDIC SURGERY

## 2024-09-13 PROCEDURE — 85730 THROMBOPLASTIN TIME PARTIAL: CPT

## 2024-09-13 PROCEDURE — 71046 X-RAY EXAM CHEST 2 VIEWS: CPT

## 2024-09-13 PROCEDURE — 87641 MR-STAPH DNA AMP PROBE: CPT

## 2024-09-13 PROCEDURE — 85610 PROTHROMBIN TIME: CPT

## 2024-09-13 PROCEDURE — 86901 BLOOD TYPING SEROLOGIC RH(D): CPT

## 2024-09-13 PROCEDURE — 81003 URINALYSIS AUTO W/O SCOPE: CPT

## 2024-09-13 PROCEDURE — 80053 COMPREHEN METABOLIC PANEL: CPT

## 2024-09-13 PROCEDURE — 83036 HEMOGLOBIN GLYCOSYLATED A1C: CPT

## 2024-09-13 PROCEDURE — 85027 COMPLETE CBC AUTOMATED: CPT

## 2024-09-23 ENCOUNTER — ANESTHESIA EVENT (OUTPATIENT)
Dept: PERIOP | Facility: HOSPITAL | Age: 69
End: 2024-09-23
Payer: MEDICARE

## 2024-09-24 ENCOUNTER — APPOINTMENT (OUTPATIENT)
Dept: GENERAL RADIOLOGY | Facility: HOSPITAL | Age: 69
End: 2024-09-24
Payer: MEDICARE

## 2024-09-24 ENCOUNTER — ANESTHESIA (OUTPATIENT)
Dept: PERIOP | Facility: HOSPITAL | Age: 69
End: 2024-09-24
Payer: MEDICARE

## 2024-09-24 ENCOUNTER — HOSPITAL ENCOUNTER (INPATIENT)
Facility: HOSPITAL | Age: 69
LOS: 1 days | Discharge: HOME | End: 2024-09-25
Attending: ORTHOPAEDIC SURGERY | Admitting: ORTHOPAEDIC SURGERY
Payer: MEDICARE

## 2024-09-24 DIAGNOSIS — Z98.1 S/P LUMBAR FUSION: Primary | ICD-10-CM

## 2024-09-24 LAB
BASOPHILS # BLD AUTO: 0.01 10*3/MM3 (ref 0–0.2)
BASOPHILS NFR BLD AUTO: 0.1 % (ref 0–1.5)
DEPRECATED RDW RBC AUTO: 44.4 FL (ref 37–54)
EOSINOPHIL # BLD AUTO: 0 10*3/MM3 (ref 0–0.4)
EOSINOPHIL NFR BLD AUTO: 0 % (ref 0.3–6.2)
ERYTHROCYTE [DISTWIDTH] IN BLOOD BY AUTOMATED COUNT: 12.7 % (ref 12.3–15.4)
HCT VFR BLD AUTO: 31.3 % (ref 34–46.6)
HGB BLD-MCNC: 10.2 G/DL (ref 12–15.9)
IMM GRANULOCYTES # BLD AUTO: 0.04 10*3/MM3 (ref 0–0.05)
IMM GRANULOCYTES NFR BLD AUTO: 0.3 % (ref 0–0.5)
LYMPHOCYTES # BLD AUTO: 1.26 10*3/MM3 (ref 0.7–3.1)
LYMPHOCYTES NFR BLD AUTO: 9.9 % (ref 19.6–45.3)
MCH RBC QN AUTO: 31.1 PG (ref 26.6–33)
MCHC RBC AUTO-ENTMCNC: 32.6 G/DL (ref 31.5–35.7)
MCV RBC AUTO: 95.4 FL (ref 79–97)
MONOCYTES # BLD AUTO: 1.05 10*3/MM3 (ref 0.1–0.9)
MONOCYTES NFR BLD AUTO: 8.3 % (ref 5–12)
NEUTROPHILS NFR BLD AUTO: 10.33 10*3/MM3 (ref 1.7–7)
NEUTROPHILS NFR BLD AUTO: 81.4 % (ref 42.7–76)
NRBC BLD AUTO-RTO: 0 /100 WBC (ref 0–0.2)
PLATELET # BLD AUTO: 173 10*3/MM3 (ref 140–450)
PMV BLD AUTO: 10.2 FL (ref 6–12)
RBC # BLD AUTO: 3.28 10*6/MM3 (ref 3.77–5.28)
WBC NRBC COR # BLD AUTO: 12.69 10*3/MM3 (ref 3.4–10.8)

## 2024-09-24 PROCEDURE — 0QP004Z REMOVAL OF INTERNAL FIXATION DEVICE FROM LUMBAR VERTEBRA, OPEN APPROACH: ICD-10-PCS | Performed by: ORTHOPAEDIC SURGERY

## 2024-09-24 PROCEDURE — 25010000002 ONDANSETRON PER 1 MG: Performed by: NURSE ANESTHETIST, CERTIFIED REGISTERED

## 2024-09-24 PROCEDURE — 25810000003 LACTATED RINGERS PER 1000 ML: Performed by: ORTHOPAEDIC SURGERY

## 2024-09-24 PROCEDURE — C1713 ANCHOR/SCREW BN/BN,TIS/BN: HCPCS | Performed by: ORTHOPAEDIC SURGERY

## 2024-09-24 PROCEDURE — 85025 COMPLETE CBC W/AUTO DIFF WBC: CPT | Performed by: ORTHOPAEDIC SURGERY

## 2024-09-24 PROCEDURE — 25010000002 CEFAZOLIN PER 500 MG: Performed by: ORTHOPAEDIC SURGERY

## 2024-09-24 PROCEDURE — 0SG00AJ FUSION OF LUMBAR VERTEBRAL JOINT WITH INTERBODY FUSION DEVICE, POSTERIOR APPROACH, ANTERIOR COLUMN, OPEN APPROACH: ICD-10-PCS | Performed by: ORTHOPAEDIC SURGERY

## 2024-09-24 PROCEDURE — 25010000002 FENTANYL CITRATE (PF) 100 MCG/2ML SOLUTION: Performed by: NURSE ANESTHETIST, CERTIFIED REGISTERED

## 2024-09-24 PROCEDURE — 25010000002 HYDROMORPHONE 1 MG/ML SOLUTION: Performed by: NURSE ANESTHETIST, CERTIFIED REGISTERED

## 2024-09-24 PROCEDURE — 0SB20ZZ EXCISION OF LUMBAR VERTEBRAL DISC, OPEN APPROACH: ICD-10-PCS | Performed by: ORTHOPAEDIC SURGERY

## 2024-09-24 PROCEDURE — 25010000002 FENTANYL CITRATE (PF) 50 MCG/ML SOLUTION: Performed by: NURSE ANESTHETIST, CERTIFIED REGISTERED

## 2024-09-24 PROCEDURE — 25010000002 PHENYLEPHRINE 10 MG/ML SOLUTION: Performed by: NURSE ANESTHETIST, CERTIFIED REGISTERED

## 2024-09-24 PROCEDURE — 25010000002 SUGAMMADEX 200 MG/2ML SOLUTION: Performed by: NURSE ANESTHETIST, CERTIFIED REGISTERED

## 2024-09-24 PROCEDURE — 25010000002 MIDAZOLAM PER 1 MG: Performed by: NURSE ANESTHETIST, CERTIFIED REGISTERED

## 2024-09-24 PROCEDURE — 72100 X-RAY EXAM L-S SPINE 2/3 VWS: CPT

## 2024-09-24 PROCEDURE — 25010000002 CEFAZOLIN PER 500 MG: Performed by: NURSE ANESTHETIST, CERTIFIED REGISTERED

## 2024-09-24 PROCEDURE — 25810000003 SODIUM CHLORIDE 0.9 % SOLUTION: Performed by: ORTHOPAEDIC SURGERY

## 2024-09-24 PROCEDURE — 25010000002 DEXAMETHASONE PER 1 MG: Performed by: NURSE ANESTHETIST, CERTIFIED REGISTERED

## 2024-09-24 PROCEDURE — 25010000002 MAGNESIUM SULFATE PER 500 MG OF MAGNESIUM: Performed by: NURSE ANESTHETIST, CERTIFIED REGISTERED

## 2024-09-24 PROCEDURE — C1769 GUIDE WIRE: HCPCS | Performed by: ORTHOPAEDIC SURGERY

## 2024-09-24 PROCEDURE — 25010000002 BUPIVACAINE 0.5 % SOLUTION: Performed by: ORTHOPAEDIC SURGERY

## 2024-09-24 PROCEDURE — 25810000003 LACTATED RINGERS PER 1000 ML: Performed by: NURSE ANESTHETIST, CERTIFIED REGISTERED

## 2024-09-24 PROCEDURE — 01NB0ZZ RELEASE LUMBAR NERVE, OPEN APPROACH: ICD-10-PCS | Performed by: ORTHOPAEDIC SURGERY

## 2024-09-24 PROCEDURE — 80048 BASIC METABOLIC PNL TOTAL CA: CPT | Performed by: ORTHOPAEDIC SURGERY

## 2024-09-24 PROCEDURE — 25010000002 PROPOFOL 200 MG/20ML EMULSION: Performed by: NURSE ANESTHETIST, CERTIFIED REGISTERED

## 2024-09-24 DEVICE — FLOSEAL WITH RECOTHROM - 10ML.
Type: IMPLANTABLE DEVICE | Site: SPINE LUMBAR | Status: FUNCTIONAL
Brand: FLOSEAL HEMOSTATIC MATRIX

## 2024-09-24 DEVICE — ROD 651003040 5.5 CCM CAPPED ROD 40MM
Type: IMPLANTABLE DEVICE | Site: SPINE LUMBAR | Status: FUNCTIONAL
Brand: CD HORIZON® SOLERA® SPINAL SYSTEM

## 2024-09-24 DEVICE — ALLOGRFT DBM GRAFTON DS INJ FIBR 6CC: Type: IMPLANTABLE DEVICE | Site: SPINE LUMBAR | Status: FUNCTIONAL

## 2024-09-24 DEVICE — SPACER 6068076 CATALYFT PL LONG 7MM
Type: IMPLANTABLE DEVICE | Site: SPINE LUMBAR | Status: FUNCTIONAL
Brand: CATALYFT PL EXPANDABLE INTERBODY SYSTEM

## 2024-09-24 DEVICE — PUTTY DBM GRAFTON 6CC: Type: IMPLANTABLE DEVICE | Site: SPINE LUMBAR | Status: FUNCTIONAL

## 2024-09-24 DEVICE — FNS MAS 55850027550 CCM 7.5 X 50
Type: IMPLANTABLE DEVICE | Site: SPINE LUMBAR | Status: FUNCTIONAL
Brand: CD HORIZON® FENESTRATED SCREW SET

## 2024-09-24 RX ORDER — ACETAMINOPHEN 160 MG/5ML
650 SOLUTION ORAL EVERY 4 HOURS PRN
Status: DISCONTINUED | OUTPATIENT
Start: 2024-09-24 | End: 2024-09-25 | Stop reason: HOSPADM

## 2024-09-24 RX ORDER — LABETALOL HYDROCHLORIDE 5 MG/ML
5 INJECTION, SOLUTION INTRAVENOUS
Status: DISCONTINUED | OUTPATIENT
Start: 2024-09-24 | End: 2024-09-24 | Stop reason: HOSPADM

## 2024-09-24 RX ORDER — NALOXONE HCL 0.4 MG/ML
0.4 VIAL (ML) INJECTION AS NEEDED
Status: DISCONTINUED | OUTPATIENT
Start: 2024-09-24 | End: 2024-09-24 | Stop reason: HOSPADM

## 2024-09-24 RX ORDER — POLYETHYLENE GLYCOL 3350 17 G/17G
17 POWDER, FOR SOLUTION ORAL DAILY PRN
Status: DISCONTINUED | OUTPATIENT
Start: 2024-09-24 | End: 2024-09-25 | Stop reason: HOSPADM

## 2024-09-24 RX ORDER — ACETAMINOPHEN 325 MG/1
650 TABLET ORAL EVERY 4 HOURS PRN
Status: DISCONTINUED | OUTPATIENT
Start: 2024-09-24 | End: 2024-09-25 | Stop reason: HOSPADM

## 2024-09-24 RX ORDER — CELECOXIB 200 MG/1
200 CAPSULE ORAL ONCE
Status: COMPLETED | OUTPATIENT
Start: 2024-09-24 | End: 2024-09-24

## 2024-09-24 RX ORDER — PROCHLORPERAZINE EDISYLATE 5 MG/ML
10 INJECTION INTRAMUSCULAR; INTRAVENOUS ONCE AS NEEDED
Status: DISCONTINUED | OUTPATIENT
Start: 2024-09-24 | End: 2024-09-24 | Stop reason: HOSPADM

## 2024-09-24 RX ORDER — MEPERIDINE HYDROCHLORIDE 25 MG/ML
12.5 INJECTION INTRAMUSCULAR; INTRAVENOUS; SUBCUTANEOUS
Status: DISCONTINUED | OUTPATIENT
Start: 2024-09-24 | End: 2024-09-24 | Stop reason: HOSPADM

## 2024-09-24 RX ORDER — DEXAMETHASONE SODIUM PHOSPHATE 4 MG/ML
INJECTION, SOLUTION INTRA-ARTICULAR; INTRALESIONAL; INTRAMUSCULAR; INTRAVENOUS; SOFT TISSUE AS NEEDED
Status: DISCONTINUED | OUTPATIENT
Start: 2024-09-24 | End: 2024-09-24 | Stop reason: SURG

## 2024-09-24 RX ORDER — PROPOFOL 10 MG/ML
INJECTION, EMULSION INTRAVENOUS AS NEEDED
Status: DISCONTINUED | OUTPATIENT
Start: 2024-09-24 | End: 2024-09-24 | Stop reason: SURG

## 2024-09-24 RX ORDER — OXYCODONE HCL 10 MG/1
10 TABLET, FILM COATED, EXTENDED RELEASE ORAL ONCE
Status: DISCONTINUED | OUTPATIENT
Start: 2024-09-24 | End: 2024-09-24 | Stop reason: HOSPADM

## 2024-09-24 RX ORDER — SODIUM CHLORIDE 0.9 % (FLUSH) 0.9 %
10 SYRINGE (ML) INJECTION EVERY 12 HOURS SCHEDULED
Status: DISCONTINUED | OUTPATIENT
Start: 2024-09-24 | End: 2024-09-25 | Stop reason: HOSPADM

## 2024-09-24 RX ORDER — ACETAMINOPHEN 650 MG/1
650 SUPPOSITORY RECTAL EVERY 4 HOURS PRN
Status: DISCONTINUED | OUTPATIENT
Start: 2024-09-24 | End: 2024-09-25 | Stop reason: HOSPADM

## 2024-09-24 RX ORDER — BISACODYL 5 MG/1
5 TABLET, DELAYED RELEASE ORAL DAILY PRN
Status: DISCONTINUED | OUTPATIENT
Start: 2024-09-24 | End: 2024-09-25 | Stop reason: HOSPADM

## 2024-09-24 RX ORDER — ONDANSETRON 2 MG/ML
INJECTION INTRAMUSCULAR; INTRAVENOUS AS NEEDED
Status: DISCONTINUED | OUTPATIENT
Start: 2024-09-24 | End: 2024-09-24 | Stop reason: SURG

## 2024-09-24 RX ORDER — SODIUM CHLORIDE 0.9 % (FLUSH) 0.9 %
10 SYRINGE (ML) INJECTION AS NEEDED
Status: DISCONTINUED | OUTPATIENT
Start: 2024-09-24 | End: 2024-09-25 | Stop reason: HOSPADM

## 2024-09-24 RX ORDER — AMOXICILLIN 250 MG
2 CAPSULE ORAL 2 TIMES DAILY
Status: DISCONTINUED | OUTPATIENT
Start: 2024-09-24 | End: 2024-09-25 | Stop reason: HOSPADM

## 2024-09-24 RX ORDER — SODIUM CHLORIDE 9 MG/ML
75 INJECTION, SOLUTION INTRAVENOUS CONTINUOUS
Status: DISCONTINUED | OUTPATIENT
Start: 2024-09-24 | End: 2024-09-25 | Stop reason: HOSPADM

## 2024-09-24 RX ORDER — ONDANSETRON 2 MG/ML
4 INJECTION INTRAMUSCULAR; INTRAVENOUS EVERY 6 HOURS PRN
Status: DISCONTINUED | OUTPATIENT
Start: 2024-09-24 | End: 2024-09-25 | Stop reason: HOSPADM

## 2024-09-24 RX ORDER — OXYCODONE HYDROCHLORIDE 5 MG/1
10 TABLET ORAL EVERY 4 HOURS PRN
Status: DISCONTINUED | OUTPATIENT
Start: 2024-09-24 | End: 2024-09-24 | Stop reason: HOSPADM

## 2024-09-24 RX ORDER — OXYCODONE HYDROCHLORIDE 5 MG/1
5 TABLET ORAL ONCE AS NEEDED
Status: DISCONTINUED | OUTPATIENT
Start: 2024-09-24 | End: 2024-09-24 | Stop reason: HOSPADM

## 2024-09-24 RX ORDER — SCOLOPAMINE TRANSDERMAL SYSTEM 1 MG/1
1 PATCH, EXTENDED RELEASE TRANSDERMAL ONCE
Status: DISCONTINUED | OUTPATIENT
Start: 2024-09-24 | End: 2024-09-24

## 2024-09-24 RX ORDER — ACETAMINOPHEN 500 MG
1000 TABLET ORAL ONCE
Status: COMPLETED | OUTPATIENT
Start: 2024-09-24 | End: 2024-09-24

## 2024-09-24 RX ORDER — PROMETHAZINE HYDROCHLORIDE 25 MG/1
25 TABLET ORAL ONCE AS NEEDED
Status: DISCONTINUED | OUTPATIENT
Start: 2024-09-24 | End: 2024-09-24 | Stop reason: HOSPADM

## 2024-09-24 RX ORDER — SODIUM CHLORIDE, SODIUM LACTATE, POTASSIUM CHLORIDE, CALCIUM CHLORIDE 600; 310; 30; 20 MG/100ML; MG/100ML; MG/100ML; MG/100ML
1000 INJECTION, SOLUTION INTRAVENOUS CONTINUOUS
Status: DISCONTINUED | OUTPATIENT
Start: 2024-09-24 | End: 2024-09-24

## 2024-09-24 RX ORDER — MAGNESIUM SULFATE HEPTAHYDRATE 500 MG/ML
INJECTION, SOLUTION INTRAMUSCULAR; INTRAVENOUS AS NEEDED
Status: DISCONTINUED | OUTPATIENT
Start: 2024-09-24 | End: 2024-09-24 | Stop reason: SURG

## 2024-09-24 RX ORDER — CYCLOBENZAPRINE HCL 10 MG
10 TABLET ORAL 3 TIMES DAILY PRN
Status: DISCONTINUED | OUTPATIENT
Start: 2024-09-24 | End: 2024-09-25 | Stop reason: HOSPADM

## 2024-09-24 RX ORDER — ONDANSETRON 2 MG/ML
4 INJECTION INTRAMUSCULAR; INTRAVENOUS ONCE AS NEEDED
Status: DISCONTINUED | OUTPATIENT
Start: 2024-09-24 | End: 2024-09-24 | Stop reason: HOSPADM

## 2024-09-24 RX ORDER — BISACODYL 10 MG
10 SUPPOSITORY, RECTAL RECTAL DAILY PRN
Status: DISCONTINUED | OUTPATIENT
Start: 2024-09-24 | End: 2024-09-25 | Stop reason: HOSPADM

## 2024-09-24 RX ORDER — PROMETHAZINE HYDROCHLORIDE 25 MG/1
25 SUPPOSITORY RECTAL ONCE AS NEEDED
Status: DISCONTINUED | OUTPATIENT
Start: 2024-09-24 | End: 2024-09-24 | Stop reason: HOSPADM

## 2024-09-24 RX ORDER — MIDAZOLAM HYDROCHLORIDE 1 MG/ML
INJECTION INTRAMUSCULAR; INTRAVENOUS AS NEEDED
Status: DISCONTINUED | OUTPATIENT
Start: 2024-09-24 | End: 2024-09-24 | Stop reason: SURG

## 2024-09-24 RX ORDER — SODIUM CHLORIDE 9 MG/ML
40 INJECTION, SOLUTION INTRAVENOUS AS NEEDED
Status: DISCONTINUED | OUTPATIENT
Start: 2024-09-24 | End: 2024-09-25 | Stop reason: HOSPADM

## 2024-09-24 RX ORDER — SODIUM CHLORIDE 0.9 % (FLUSH) 0.9 %
10 SYRINGE (ML) INJECTION AS NEEDED
Status: DISCONTINUED | OUTPATIENT
Start: 2024-09-24 | End: 2024-09-24 | Stop reason: HOSPADM

## 2024-09-24 RX ORDER — NALOXONE HCL 0.4 MG/ML
0.4 VIAL (ML) INJECTION
Status: DISCONTINUED | OUTPATIENT
Start: 2024-09-24 | End: 2024-09-25 | Stop reason: HOSPADM

## 2024-09-24 RX ORDER — PHENYLEPHRINE HYDROCHLORIDE 10 MG/ML
INJECTION INTRAVENOUS AS NEEDED
Status: DISCONTINUED | OUTPATIENT
Start: 2024-09-24 | End: 2024-09-24 | Stop reason: SURG

## 2024-09-24 RX ORDER — FENTANYL CITRATE 50 UG/ML
INJECTION, SOLUTION INTRAMUSCULAR; INTRAVENOUS AS NEEDED
Status: DISCONTINUED | OUTPATIENT
Start: 2024-09-24 | End: 2024-09-24 | Stop reason: SURG

## 2024-09-24 RX ORDER — BUPIVACAINE HYDROCHLORIDE 5 MG/ML
INJECTION, SOLUTION PERINEURAL AS NEEDED
Status: DISCONTINUED | OUTPATIENT
Start: 2024-09-24 | End: 2024-09-24 | Stop reason: HOSPADM

## 2024-09-24 RX ORDER — DIPHENHYDRAMINE HYDROCHLORIDE 50 MG/ML
12.5 INJECTION INTRAMUSCULAR; INTRAVENOUS ONCE AS NEEDED
Status: DISCONTINUED | OUTPATIENT
Start: 2024-09-24 | End: 2024-09-24 | Stop reason: HOSPADM

## 2024-09-24 RX ORDER — FENTANYL CITRATE 50 UG/ML
50 INJECTION, SOLUTION INTRAMUSCULAR; INTRAVENOUS
Status: DISCONTINUED | OUTPATIENT
Start: 2024-09-24 | End: 2024-09-24 | Stop reason: HOSPADM

## 2024-09-24 RX ORDER — LIDOCAINE HYDROCHLORIDE 10 MG/ML
INJECTION, SOLUTION EPIDURAL; INFILTRATION; INTRACAUDAL; PERINEURAL AS NEEDED
Status: DISCONTINUED | OUTPATIENT
Start: 2024-09-24 | End: 2024-09-24 | Stop reason: SURG

## 2024-09-24 RX ORDER — SODIUM CHLORIDE, SODIUM LACTATE, POTASSIUM CHLORIDE, CALCIUM CHLORIDE 600; 310; 30; 20 MG/100ML; MG/100ML; MG/100ML; MG/100ML
INJECTION, SOLUTION INTRAVENOUS CONTINUOUS PRN
Status: DISCONTINUED | OUTPATIENT
Start: 2024-09-24 | End: 2024-09-24 | Stop reason: SURG

## 2024-09-24 RX ORDER — HYDRALAZINE HYDROCHLORIDE 20 MG/ML
5 INJECTION INTRAMUSCULAR; INTRAVENOUS
Status: DISCONTINUED | OUTPATIENT
Start: 2024-09-24 | End: 2024-09-24 | Stop reason: HOSPADM

## 2024-09-24 RX ORDER — HYDROMORPHONE HYDROCHLORIDE 1 MG/ML
0.25 INJECTION, SOLUTION INTRAMUSCULAR; INTRAVENOUS; SUBCUTANEOUS EVERY 4 HOURS PRN
Status: DISCONTINUED | OUTPATIENT
Start: 2024-09-24 | End: 2024-09-25 | Stop reason: HOSPADM

## 2024-09-24 RX ORDER — ROCURONIUM BROMIDE 10 MG/ML
INJECTION, SOLUTION INTRAVENOUS AS NEEDED
Status: DISCONTINUED | OUTPATIENT
Start: 2024-09-24 | End: 2024-09-24 | Stop reason: SURG

## 2024-09-24 RX ORDER — OXYCODONE HYDROCHLORIDE 5 MG/1
5 TABLET ORAL EVERY 4 HOURS PRN
Status: DISCONTINUED | OUTPATIENT
Start: 2024-09-24 | End: 2024-09-25 | Stop reason: HOSPADM

## 2024-09-24 RX ADMIN — SODIUM CHLORIDE 75 ML/HR: 9 INJECTION, SOLUTION INTRAVENOUS at 18:14

## 2024-09-24 RX ADMIN — SUGAMMADEX 200 MG: 100 INJECTION, SOLUTION INTRAVENOUS at 14:23

## 2024-09-24 RX ADMIN — PHENYLEPHRINE HYDROCHLORIDE 100 MCG: 10 INJECTION INTRAVENOUS at 13:20

## 2024-09-24 RX ADMIN — PHENYLEPHRINE HYDROCHLORIDE 100 MCG: 10 INJECTION INTRAVENOUS at 14:16

## 2024-09-24 RX ADMIN — CELECOXIB 200 MG: 200 CAPSULE ORAL at 06:46

## 2024-09-24 RX ADMIN — SODIUM CHLORIDE, SODIUM LACTATE, POTASSIUM CHLORIDE, AND CALCIUM CHLORIDE: .6; .31; .03; .02 INJECTION, SOLUTION INTRAVENOUS at 13:23

## 2024-09-24 RX ADMIN — ROCURONIUM BROMIDE 10 MG: 10 INJECTION, SOLUTION INTRAVENOUS at 10:54

## 2024-09-24 RX ADMIN — ROCURONIUM BROMIDE 10 MG: 10 INJECTION, SOLUTION INTRAVENOUS at 11:35

## 2024-09-24 RX ADMIN — ROCURONIUM BROMIDE 10 MG: 10 INJECTION, SOLUTION INTRAVENOUS at 10:05

## 2024-09-24 RX ADMIN — ONDANSETRON 4 MG: 2 INJECTION INTRAMUSCULAR; INTRAVENOUS at 13:31

## 2024-09-24 RX ADMIN — FENTANYL CITRATE 50 MCG: 50 INJECTION, SOLUTION INTRAMUSCULAR; INTRAVENOUS at 08:26

## 2024-09-24 RX ADMIN — SENNOSIDES AND DOCUSATE SODIUM 2 TABLET: 50; 8.6 TABLET ORAL at 21:17

## 2024-09-24 RX ADMIN — HYDROMORPHONE HYDROCHLORIDE 0.5 MG: 1 INJECTION, SOLUTION INTRAMUSCULAR; INTRAVENOUS; SUBCUTANEOUS at 16:09

## 2024-09-24 RX ADMIN — HYDROMORPHONE HYDROCHLORIDE 0.5 MG: 1 INJECTION, SOLUTION INTRAMUSCULAR; INTRAVENOUS; SUBCUTANEOUS at 09:20

## 2024-09-24 RX ADMIN — SCOPALAMINE 1 PATCH: 1 PATCH, EXTENDED RELEASE TRANSDERMAL at 06:47

## 2024-09-24 RX ADMIN — ROCURONIUM BROMIDE 10 MG: 10 INJECTION, SOLUTION INTRAVENOUS at 12:18

## 2024-09-24 RX ADMIN — SODIUM CHLORIDE, POTASSIUM CHLORIDE, SODIUM LACTATE AND CALCIUM CHLORIDE 1000 ML: 600; 310; 30; 20 INJECTION, SOLUTION INTRAVENOUS at 06:47

## 2024-09-24 RX ADMIN — Medication 10 MG: at 11:06

## 2024-09-24 RX ADMIN — MIDAZOLAM 2 MG: 1 INJECTION INTRAMUSCULAR; INTRAVENOUS at 07:39

## 2024-09-24 RX ADMIN — Medication 10 ML: at 21:17

## 2024-09-24 RX ADMIN — FENTANYL CITRATE 50 MCG: 50 INJECTION, SOLUTION INTRAMUSCULAR; INTRAVENOUS at 07:44

## 2024-09-24 RX ADMIN — SODIUM CHLORIDE, SODIUM LACTATE, POTASSIUM CHLORIDE, AND CALCIUM CHLORIDE: .6; .31; .03; .02 INJECTION, SOLUTION INTRAVENOUS at 07:39

## 2024-09-24 RX ADMIN — PHENYLEPHRINE HYDROCHLORIDE 100 MCG: 10 INJECTION INTRAVENOUS at 13:31

## 2024-09-24 RX ADMIN — ROCURONIUM BROMIDE 45 MG: 10 INJECTION, SOLUTION INTRAVENOUS at 07:45

## 2024-09-24 RX ADMIN — ROCURONIUM BROMIDE 10 MG: 10 INJECTION, SOLUTION INTRAVENOUS at 09:00

## 2024-09-24 RX ADMIN — DEXAMETHASONE SODIUM PHOSPHATE 4 MG: 4 INJECTION, SOLUTION INTRAMUSCULAR; INTRAVENOUS at 07:55

## 2024-09-24 RX ADMIN — MAGNESIUM SULFATE HEPTAHYDRATE 1 G: 500 INJECTION, SOLUTION INTRAMUSCULAR; INTRAVENOUS at 07:55

## 2024-09-24 RX ADMIN — PROPOFOL 40 MG: 10 INJECTION, EMULSION INTRAVENOUS at 09:26

## 2024-09-24 RX ADMIN — ROCURONIUM BROMIDE 10 MG: 10 INJECTION, SOLUTION INTRAVENOUS at 13:23

## 2024-09-24 RX ADMIN — LIDOCAINE HYDROCHLORIDE 50 MG: 10 INJECTION, SOLUTION EPIDURAL; INFILTRATION; INTRACAUDAL; PERINEURAL at 07:44

## 2024-09-24 RX ADMIN — ROCURONIUM BROMIDE 5 MG: 10 INJECTION, SOLUTION INTRAVENOUS at 07:44

## 2024-09-24 RX ADMIN — PROPOFOL 175 MCG/KG/MIN: 10 INJECTION, EMULSION INTRAVENOUS at 07:45

## 2024-09-24 RX ADMIN — ACETAMINOPHEN 1000 MG: 500 TABLET, FILM COATED ORAL at 06:46

## 2024-09-24 RX ADMIN — OXYCODONE HYDROCHLORIDE 5 MG: 5 TABLET ORAL at 21:17

## 2024-09-24 RX ADMIN — Medication 30 MG: at 09:30

## 2024-09-24 RX ADMIN — PROPOFOL 160 MG: 10 INJECTION, EMULSION INTRAVENOUS at 07:44

## 2024-09-24 RX ADMIN — PHENYLEPHRINE HYDROCHLORIDE 100 MCG: 10 INJECTION INTRAVENOUS at 13:37

## 2024-09-24 RX ADMIN — CEFAZOLIN 2 G: 2 INJECTION, POWDER, LYOPHILIZED, FOR SOLUTION INTRAVENOUS at 11:30

## 2024-09-24 RX ADMIN — CEFAZOLIN 2000 MG: 2 INJECTION, POWDER, FOR SOLUTION INTRAMUSCULAR; INTRAVENOUS at 07:36

## 2024-09-24 RX ADMIN — SODIUM CHLORIDE 2000 MG: 900 INJECTION INTRAVENOUS at 18:26

## 2024-09-24 RX ADMIN — OXYCODONE 10 MG: 5 TABLET ORAL at 16:10

## 2024-09-24 RX ADMIN — Medication 10 MG: at 10:24

## 2024-09-24 RX ADMIN — HYDROMORPHONE HYDROCHLORIDE 0.5 MG: 1 INJECTION, SOLUTION INTRAMUSCULAR; INTRAVENOUS; SUBCUTANEOUS at 12:20

## 2024-09-24 RX ADMIN — FENTANYL CITRATE 50 MCG: 50 INJECTION, SOLUTION INTRAMUSCULAR; INTRAVENOUS at 15:42

## 2024-09-24 NOTE — PLAN OF CARE
Goal Outcome Evaluation:                      Pt up to SIPS from PACU. Pt on 3L oxygen. Pt able to make needs known. Safety precautions in place. Plan of care ongoing.

## 2024-09-24 NOTE — OP NOTE
LUMBAR LAMINECTOMY TRANSFORAMINAL LUMBAR INTERBODY FUSION  Procedure Report    Patient Name:  Lesli Ambrocio  YOB: 1955    Date of Surgery:  9/24/2024     Indications: Ms. Ambrocio is a 69-year-old female with a history of previous L4-5 fusion with L3-4 adjacent level breakdown with severe stenosis and spondylolisthesis.  She tried nonoperative treatment, but failed, so we discussed the risks and benefits of L3-4 TLIF above her L4-5 fusion with possible revision of her previous hardware.  She agreed with the plan.    Pre-op Diagnosis:   Spondylolisthesis of lumbar region [M43.16]  Intervertebral disc disorder with radiculopathy of lumbar region [M51.16]  Fusion of lumbar spine [M43.26]  Lumbar spinal stenosis due to adjacent segment disease after fusion procedure [M48.061, M51.36]       Post-Op Diagnosis Codes:     * Spondylolisthesis of lumbar region [M43.16]     * Intervertebral disc disorder with radiculopathy of lumbar region [M51.16]     * Fusion of lumbar spine [M43.26]     * Lumbar spinal stenosis due to adjacent segment disease after fusion procedure [M48.061, M51.36]    Procedure/CPT® Codes:      Procedure(s):  MINIMAL ACCESSABLE SURGICAL TECHNIQUE TRANSFORAMINAL LUMBAR FUSION AT LUMBAR THREE-FOUR, REVISION OF LUMBAR FOUR  PEDICLE SCREW, REMOVAL OF LUMBAR FIVE SCREWS, INSTRUMENTATION WITH INSTRUMENTED POSTERIOR SPINAL FUSION LUMBAR THREE TO FOUR        Staff:  Surgeon(s):  Evelio Eugene MD    Assistant: Cristian Hunt CSFA    Anesthesia: General    Estimated Blood Loss: 100 mL    Implants:    Implant Name Type Inv. Item Serial No.  Lot No. LRB No. Used Action   KT SEAL HEMOS ABS FLOSEAL MATRX 1.5/FAST/PREP 5000/IU 10ML - [id number] Implant KT SEAL HEMOS ABS FLOSEAL MATRX 1.5/FAST/PREP 5000/IU 10ML  Beverly ENJORE [id number] N/A 2 Implanted   KT SEAL HEMOS ABS FLOSEAL MATRX FAST/PREP 10ML - [id number] Implant KT SEAL HEMOS ABS FLOSEAL MATRX FAST/PREP 10ML   [facility] [id number] N/A 5 Implanted   ALLOGRFT DBM GIULIANA DS INJ FIBR [facility] - [id number] - [id number] Implant ALLOGRFT DBM GIULIANA DS INJ FIBR [facility] [id number] [employer] . N/A 1 Implanted   PUTTY DBM GIULIANA [facility] - [id number] - [id number] Implant PUTTY DBM GIULIANA [facility] [id number] [employer] . N/A 1 Implanted   SPACR IB LIF CATALYFT/PL EXP TI LNG [id number] - [id number] Implant SPACR IB LIF CATALYFT/PL EXP TI LNG [id number]  [employer] [id number] N/A 1 Implanted   pedicle screw     . N/A 4 Explanted   locking caps     . N/A 4 Explanted   lumbar rods     . N/A 2 Explanted   SCRW ST SOLERA VOYAGER 5.5MM - [id number] Implant SCRW ST SOLERA VOYAGER 5.5MM  [employer] . N/A 4 Implanted   SCRW FEN MA VOYAGER 5.5X45MM - [id number] Implant SCRW FEN MA VOYAGER 5.5X45MM  [employer] . N/A 2 Implanted   7.5 x 50 Voyager FMAS    [employer] . N/A 2 Implanted   SPACR IB LIF CATALYFT/PL EXP TI LNG [id number] - [id number] Implant SPACR IB LIF CATALYFT/PL EXP TI LNG [id number]  [employer] . N/A 1 Implanted   ERIKA SOLERA VOYAGER CAPPED 5.5X40MM - [id number] Implant ERIKA SOLERA VOYAGER CAPPED 5.5X40MM  [employer] . N/A 1 Implanted   ERIKA SOLERA VOYAGER COCR PREBNT CAPPED 5.5X45MM - [id number] Implant ERIKA SOLERA VOYAGER COCR PREBNT CAPPED 5.5X45MM  [employer] . N/A 1 Implanted       Specimen:          None        Findings: Severe stenosis.    Complications: None    Description of Procedure: I saw the patient in preop and we reviewed the surgical plan.  I put my initials on her back.  She was brought to the operating room, put under general anesthesia, and intubated.  She was then rolled to prone position on a Ko frame with all bony prominences padded.  She was then prepped and draped in the usual manner.  I then took a timeout to confirm the patient's name, the planned procedure, her allergies, her CODE STATUS, her antibiotics.  I then made a small incision for the Stealth array to be placed at the PSIS and  malleted the pin into the PSIS.  We then put half sheet drapes over the patient, brought in the O-arm, and did a spin to allow for navigation.  I then found the site on her back in line with her L5 and L4 screws.  On the left.  They still were in line with her previous incision.  I also found the site for her L3 screw.  I then cut through her previous incision and cut down through scar tissue and muscle to the old hardware.  I was able to remove the screw caps and had to remove bone and scar tissue off of the echo to get the echo out.  The ecoh was a 4.5 echo in the current system we were using used a 5.5 echo, so we had to remove the screws at L4 and L5.  I put a guidewire in L4.  I then decided to leave L5 free since she has robust fusion there was no significant need to extend L5.  I then used the navigated drill to find the starting point for my left L3 pedicle screw.  I then used the tap to tap the pedicle.  I then placed a guidewire and 4 the cannulated screws.  I then went to the right side.  I found the spot for the screw heads and made my incision in the same manner as before.  I then cut down to the screw heads.  The screw heads were covered in bone, so I did use a combination of rongeur, osteotome, and high-speed bur to remove the bone off of the screws.  Once I was able to do that I was able to get the screw caps out and get the echo out.  There was difficulty getting the L5 screw out because the tulip head was stuck in bone, so I did remove some of the bone to get the tulip head to move.  I then was able to get the L5 screw out.  The L4 screw came out more easily.  I replaced it with the same size screw that was in before which was a 7.5 x 50 pedicle screw.  I then used the navigated drill and the navigated tap to tap the L3 pedicle and then placed a 5.5 x 45 screw.  This side was then done so I went back to the left and dilated down to the left L3-4 facet.  The facet was very large.  I used electrocautery to  remove the muscle from the top of the joint and the lamina.  It was difficult to discern the anatomy due to the large osteophytes, so I used the high-speed bur to bur down the L3-4 osteophytes to recreate more normal anatomy.  I then was able to use the high-speed bur to thin out the lamina on the left side making a laminotomy then with a Kerrison.  I then continued over and decompressed the right side and a sublaminar fashion using the high-speed bur to thin out the lamina on the right getting down to do a medial facetectomy on the right.  I stopped once I was in the foramen.  At this point the right side appeared well decompressed.  There was scar calcified ligamentum at the top of the lamina below that I was able to remove from part of the dura.  I then used the high-speed bur to cut from the top of the laminotomy out through the pars and was able to remove the large inferior articular process from L3.  I then used a combination of the high-speed bur and the Kerrison to remove the superior articular facet of L4.  I then went back through and remove the ligamentum flavum on the left and the right decompressing the canal.  I then removed the ligamentum and the anterior facet capsule from the left foramen.  I used bipolar cautery to stop any epidural bleeders.  I was then able to find the disc space and made an annulotomy into the disc space.  I used a combination of pituitary, bhumika, curettes to remove the disc material and the cartilaginous endplate.  I then malleted the sizer into the disc space for the cattle lift cage and a 7 x 27 was the appropriate size.  I then irrigated the disc space with saline.  I then packed the disc space with graft on and local autograft.  I then placed the cage using navigation to find my position on the anterior ring.  I then distracted the cage until it torqued.  I then placed rods on both sides.  There was a muscle bleeder on the left that took some time to get to stop at this  point.  I stopped it with bipolar cautery and Floseal.  I then was able to put the rods back in, put the screw, Compress on the Rods, and Locked down the Screw Caps on Both Sides.  We Then Ran the O-Arm to Check the Position of the Hardware.  The L4 Screws Were Still Where They Were from before.  The L3 Screws Looked Appropriately Placed.  The Cage Looked Appropriately Placed.  I Then Broke off the Screw Caps, Broke off the Screw Tabs, and Then Irrigated the Wounds.  I Then Closed the Fascia with 0 Vicryl.  She Was Then Closed with 2-0 Vicryl and Running Monocryl.  She Was Dressed with Skin Glue and Sterile Dressings.  In recovery she was moving her lower extremities to command.    Assistant: Cristian Hunt CSFA  was responsible for performing the following activities: Retraction, Suction, Irrigation, Suturing, Closing, and Placing Dressing and their skilled assistance was necessary for the success of this case.    Evelio Eugene MD     Date: 9/24/2024  Time: 14:30 EDT

## 2024-09-24 NOTE — H&P
"[facility]   HISTORY AND PHYSICAL    Patient Name: Lesli Ambrocio  : 1955  MRN: [id number]  Primary Care Physician:  Belen Mulligan MD  Date of admission: 2024    Subjective   Subjective     Chief Complaint: back pain and left leg numbness and pain    Ms. Ambrocio is a 69 year old female with a history of L4-5 TLIF done around 6 years ago with a complaint of pain in the left and right hips along with left leg numbness.  She was treated non-operatively, but the symptoms continued.  MRI showed severe stenosis with spondylolisthesis at L3-4.    Personal History     Past Medical History:   Diagnosis Date    Arthritis     Back pain     Colon abnormality     \"LOOPY COLON\" PER PATIENT    PONV (postoperative nausea and vomiting)     Right knee pain        Past Surgical History:   Procedure Laterality Date    BACK SURGERY      BILATERAL BREAST REDUCTION Bilateral 10/3/2022    Procedure: BILATERAL BREAST REDUCTION;  Surgeon: CHARLEE Day MD;  Location: [facility] OR;  Service: Plastics;  Laterality: Bilateral;    BLEPHAROPLASTY Bilateral 2023    Procedure: BILATERAL UPPER LID BLEPHAROPLASTY;  Surgeon: CHARLEE Day MD;  Location: [facility] OR;  Service: Plastics;  Laterality: Bilateral;    CARPAL TUNNEL RELEASE Bilateral     COLONOSCOPY      COLONOSCOPY N/A 2024    Procedure: COLONOSCOPY to cecum;  Surgeon: Shar Young MD;  Location: [facility] ENDOSCOPY;  Service: General;  Laterality: N/A;  pre: family hx of colon cancer  post:diverticulosis    ALLAN'S NEUROMA EXCISION Bilateral     TRIGGER FINGER RELEASE Left        Family History: family history is not on file. Otherwise pertinent FHx was reviewed and not pertinent to current issue.    Social History:  reports that she has never smoked. She has never used smokeless tobacco. She reports current alcohol use of about 2.0 standard drinks of alcohol per week. She reports that she does not use drugs.    Home Medications:  Calcium, " acetaminophen, cholecalciferol, meloxicam, and multivitamin with minerals    Allergies:  No Known Allergies    Objective    Objective     Vitals:   Temp:  [98.6 °F (37 °C)] 98.6 °F (37 °C)  Heart Rate:  [64] 64  Resp:  [13] 13  BP: (166)/(77) 166/77    GEN: no distress  HEENT: normocephalic  CV: RRR  Resp: no wheezes  BLE: motor intact    Result Review    Result Review:  I have personally reviewed the results from the time of this admission to 9/24/2024 08:14 EDT and agree with these findings:  [x]  Laboratory list / accordion  []  Microbiology  [x]  Radiology  []  EKG/Telemetry   []  Cardiology/Vascular   []  Pathology  []  Old records  []  Other:      Assessment & Plan   Assessment / Plan     Brief Patient Summary:  Lesli Ambrocio is a 69 y.o. female who has previous L4-5 TLIF here for L3-4 TLIF with removal of previous hardware and posterior instrumentation from L3-L5     Active Hospital Problems:  Active Hospital Problems    Diagnosis     **S/P lumbar fusion      Plan:   I discussed the risks and benefits of L3-4 TLIF with removal of previous hardware and posterior instrumentation from L3-L5.  She agreed to go forward.  She has held her NSAIDS.      VTE Prophylaxis:  No VTE prophylaxis order currently exists.        CODE STATUS:       Admission Status:  I believe this patient meets inpatient status.    Evelio Eugene MD

## 2024-09-25 VITALS
HEART RATE: 74 BPM | BODY MASS INDEX: 28.82 KG/M2 | TEMPERATURE: 99.5 F | OXYGEN SATURATION: 99 % | DIASTOLIC BLOOD PRESSURE: 73 MMHG | WEIGHT: 156.6 LBS | RESPIRATION RATE: 16 BRPM | HEIGHT: 62 IN | SYSTOLIC BLOOD PRESSURE: 117 MMHG

## 2024-09-25 LAB
ANION GAP SERPL CALCULATED.3IONS-SCNC: 8.9 MMOL/L (ref 5–15)
BUN SERPL-MCNC: 15 MG/DL (ref 8–23)
BUN/CREAT SERPL: 22.7 (ref 7–25)
CALCIUM SPEC-SCNC: 8.5 MG/DL (ref 8.6–10.5)
CHLORIDE SERPL-SCNC: 107 MMOL/L (ref 98–107)
CO2 SERPL-SCNC: 24.1 MMOL/L (ref 22–29)
CREAT SERPL-MCNC: 0.66 MG/DL (ref 0.57–1)
EGFRCR SERPLBLD CKD-EPI 2021: 95.7 ML/MIN/1.73
GLUCOSE SERPL-MCNC: 161 MG/DL (ref 65–99)
POTASSIUM SERPL-SCNC: 4.1 MMOL/L (ref 3.5–5.2)
SODIUM SERPL-SCNC: 140 MMOL/L (ref 136–145)

## 2024-09-25 PROCEDURE — 97161 PT EVAL LOW COMPLEX 20 MIN: CPT

## 2024-09-25 PROCEDURE — 25010000002 CEFAZOLIN PER 500 MG: Performed by: ORTHOPAEDIC SURGERY

## 2024-09-25 PROCEDURE — 97166 OT EVAL MOD COMPLEX 45 MIN: CPT

## 2024-09-25 RX ORDER — OXYCODONE HYDROCHLORIDE 5 MG/1
5 TABLET ORAL EVERY 4 HOURS PRN
Qty: 60 TABLET | Refills: 0 | Status: SHIPPED | OUTPATIENT
Start: 2024-09-25

## 2024-09-25 RX ORDER — DOCUSATE SODIUM 100 MG/1
100 CAPSULE, LIQUID FILLED ORAL 2 TIMES DAILY PRN
Qty: 40 CAPSULE
Start: 2024-09-25

## 2024-09-25 RX ORDER — CEPHALEXIN 500 MG/1
500 CAPSULE ORAL 3 TIMES DAILY
Qty: 15 CAPSULE | Refills: 0 | Status: SHIPPED | OUTPATIENT
Start: 2024-09-25 | End: 2024-09-30

## 2024-09-25 RX ORDER — CYCLOBENZAPRINE HCL 10 MG
10 TABLET ORAL 3 TIMES DAILY PRN
Qty: 60 TABLET | Refills: 0 | Status: SHIPPED | OUTPATIENT
Start: 2024-09-25

## 2024-09-25 RX ADMIN — SENNOSIDES AND DOCUSATE SODIUM 2 TABLET: 50; 8.6 TABLET ORAL at 08:02

## 2024-09-25 RX ADMIN — OXYCODONE HYDROCHLORIDE 5 MG: 5 TABLET ORAL at 08:02

## 2024-09-25 RX ADMIN — OXYCODONE HYDROCHLORIDE 5 MG: 5 TABLET ORAL at 02:34

## 2024-09-25 RX ADMIN — SODIUM CHLORIDE 2000 MG: 900 INJECTION INTRAVENOUS at 02:34

## 2024-09-25 RX ADMIN — OXYCODONE HYDROCHLORIDE 5 MG: 5 TABLET ORAL at 13:04

## 2024-09-25 RX ADMIN — Medication 10 ML: at 08:02

## 2024-09-25 NOTE — PLAN OF CARE
Goal Outcome Evaluation:   Pt is A&Ox4 and did fine throughout the night. Pt is a standby assist with the walker. Pt had minimal c/o pain, pain managed with PRN pain meds. Pt able to make needs known, call light within reach, and VSS

## 2024-09-25 NOTE — DISCHARGE SUMMARY
"    Discharge Summary    Date of Admission: 9/24/2024  5:48 AM  Date of Discharge:  9/25/2024    Discharge Diagnosis:   Spondylolisthesis of lumbar region [M43.16]  Intervertebral disc disorder with radiculopathy of lumbar region [M51.16]  Fusion of lumbar spine [M43.26]  Lumbar spinal stenosis due to adjacent segment disease after fusion procedure [M48.061, M51.36]  S/P lumbar fusion [Z98.1]      PMHX:   Past Medical History:   Diagnosis Date    Arthritis     Back pain     Colon abnormality     \"LOOPY COLON\" PER PATIENT    PONV (postoperative nausea and vomiting)     Right knee pain        Discharge Disposition  Home or Self Care    Procedures Performed  Procedure(s):  MINIMAL ACCESSABLE SURGICAL TECHNIQUE TRANSFORAMINAL LUMBAR FUSION AT LUMBAR THREE-FOUR, REVISION OF LUMBAR FOUR  PEDICLE SCREW, REMOVAL OF LUMBAR FIVE SCREWS, INSTRUMENTATION WITH INSTRUMENTED POSTERIOR SPINAL FUSION LUMBAR THREE TO FOUR       Indication for Admission  Patient is a 69 y.o. female admitted after undergoing the above surgical procedure. They were admitted for post-operative pain control, medical management and physical therapy.  They progressed with physical therapy.  They were deemed stable for discharge.      Consults:   Consults       No orders found for last 30 day(s).            Discharge Instructions:  No bending, twisting or lifting with back  Keep incision clean and dry.  Do not submerge in water.  You can shower with waterproof dressing.  Dry gauze and tape dressing daily as needed   No driving  Call office or go to ER for increasing pain, numbness or weakness; for fevers more than 100.8; wound drainage or erythema; difficulty urinating  Follow up in 1 week in the office    Discharge Medications     Discharge Medications        New Medications        Instructions Start Date   cephalexin 500 MG capsule  Commonly known as: KEFLEX   500 mg, Oral, 3 Times Daily      cyclobenzaprine 10 MG tablet  Commonly known as: FLEXERIL   10 " mg, Oral, 3 Times Daily PRN      docusate sodium 100 MG capsule  Commonly known as: Colace   100 mg, Oral, 2 Times Daily PRN      oxyCODONE 5 MG immediate release tablet  Commonly known as: ROXICODONE   5 mg, Oral, Every 4 Hours PRN             Continue These Medications        Instructions Start Date   acetaminophen 500 MG tablet  Commonly known as: TYLENOL   1,000 mg, Oral, Every 6 Hours PRN      Calcium 250 MG capsule   1 tablet, Oral, Daily      cholecalciferol 25 MCG (1000 UT) tablet  Commonly known as: VITAMIN D3   1,000 Units, Oral, Daily      multivitamin with minerals tablet tablet   1 tablet, Oral, Daily             Stop These Medications      meloxicam 15 MG tablet  Commonly known as: MOBIC              Discharge Diet:   Diet Instructions       Advance Diet As Tolerated -Target Diet: Regular diet      Target Diet: Regular diet            Activity at Discharge:   Activity Instructions       Discharge Activity      1) No driving  2) No work  3) May shower with waterproof bandage, keep incisions clean and dry  4) Do not lift / push / pull more than 5 lbs.  5) No bending or twisting with back            Follow-up Appointments  No future appointments.  Additional Instructions for the Follow-ups that You Need to Schedule       Discharge Follow-up with Specified Provider: Evelio Eugene MD; 1 Week   As directed      To: Evelio Eugene MD   Follow Up: 1 Week        Dressing Change Instructions   As directed      Dressing change: change dressings with dry gauze and tape dressing daily or as needed for drainage.  If no drainage then can stop with dressings after a week    Order Comments: Dressing change: change dressings with dry gauze and tape dressing daily or as needed for drainage.  If no drainage then can stop with dressings after a week         Notify Physician or Go To The ED For the Following Conditions   As directed      Fevers more than 100.8, chills, increasing pain, increasing numbness or weakness,  increasing drainage, chest pain, shortness of breath.    Order Comments: Fevers more than 100.8, chills, increasing pain, increasing numbness or weakness, increasing drainage, chest pain, shortness of breath.                 Test Results Pending at Discharge       Evelio Eugene MD  09/25/24,  13:36 EDT

## 2024-09-25 NOTE — PLAN OF CARE
Goal Outcome Evaluation:  Plan of Care Reviewed With: patient, spouse   Pt presents as a 70 y/o F POD # 1 for L3-5 hardware removal and L3-4 TLIF per Dr. Eugene. Procedure was tolerated well. Pt has hx 4 prior spinal surgeries. Pt A and O x 4. At baseline, pt lives with spouse in [facility]  with 2 FELICIANO and she is typically independent with community mobility without AD. This date, pt reinforced spinal precautions and reviewed log roll technique. Pt ambulated 400 feet with rolling walker with supervision and pt ascended and descended 4 steps with SBA of 1. Pt will need a rolling walker for home- CM notified. PT will complete PT orders. PT recommendation is Home with Assist.                Anticipated Discharge Disposition (PT): home with assist

## 2024-09-25 NOTE — PROGRESS NOTES
I received a message that Ms. Ambrocio did well with therapy and her pain was well controlled.  She is getting a walker.  In that case we can let her go home today.

## 2024-09-25 NOTE — DISCHARGE PLACEMENT REQUEST
"Yovanny Ambrocio (69 y.o. Female)       Date of Birth   1955    Social Security Number       Address   [address] [place] [address] [address] [facility] [address]    Home Phone   [phone]    MRN   [id number]       [employer]   [employer]    Marital Status                               Admission Date   9/24/24    Admission Type   Elective    Admitting Provider   Evelio Eugene MD    Attending Provider   Evelio Eugene MD    Department, Room/Bed   [facility] SURGICAL INPATIENT, 4123/1       Discharge Date       Discharge Disposition       Discharge Destination                                 Attending Provider: Evelio Eugene MD    Allergies: No Known Allergies    Isolation: None   Infection: None   Code Status: Prior    Ht: 157.5 cm (62.01\")   Wt: 71 kg (156 lb 9.6 oz)    Admission Cmt: None   Principal Problem: S/P lumbar fusion [Z98.1]                   Active Insurance as of 9/24/2024       Primary Coverage       Payor Plan Insurance Group Employer/Plan Group    MEDICARE MEDICARE A & B        Payor Plan Address Payor Plan Phone Number Payor Plan Fax Number Effective Dates    [address] [phone]  4/1/2021 - None Entered    [facility] [id number]         Subscriber Name Subscriber Birth Date Member ID       YOVANNY AMBROCIO 1955 [id number]               Secondary Coverage       Payor Plan Insurance Group Employer/Plan Group    ANTH BLUE CROSS [facility] SUPP [id number]       Payor Plan Address Payor Plan Phone Number Payor Plan Fax Number Effective Dates    [address]   9/1/2021 - None Entered    [facility] [id number]         Subscriber Name Subscriber Birth Date Member ID       YOVANNY AMBROCIO 1955 [id number]                     Emergency Contacts        (Rel.) Home Phone Work Phone Mobile Phone    CristóbalJarad (Spouse) -- -- [phone]            "

## 2024-09-25 NOTE — DISCHARGE INSTRUCTIONS
"    Dr. Evelio Eugene Lumbar Discharge Instructions:  Office Phone Number: [phone]    \"I would like to thank you for the opportunity for trusting me in taking care of you during your recent surgical procedure. I do not take that trust lightly, and I look forward and would be honored any opportunity in providing future orthopedic care to you or your family.\"        I. ACTIVITIES:  1. Activities of Daily Living:  No tub baths, hot tubs, or swimming pools for 4 weeks  You may shower with waterproof dressing.      II. Restrictions  No bending or twisting with your back  No heavy lifting with back    III. INCISION CARE:  Dr. Eugene takes great care in closing your incision to give you the best opportunity for a healthy incision with minimal scarring. He places sutures below the skin surface that will eventually dissolve.  The incision is then covered with a skin glue which makes the incision water tight, and minimizes bleeding onto the dressing.  No staples are used.  Occasionally one of the buried stitches may come to the skin surface and may need to be removed.  Please resist the temptation of removing the stitch by yourself.  After day 7 as long as incision is dry and intact, you may leave the dressing off and open to air.  You will need to find underwear that does not rub on the incision for a few weeks after the surgery.  You may find it more comfortable to place a dressing on to keep your underwear from rubbing the incision.       Change dressing with dry gauze and paper tape daily or as needed.   No creams or ointments to the incision              Check dressing every day and notify surgeon immediately if any of the following signs or symptoms are noted:  Increase in redness  Increase in swelling of the entire extremity that does not go away with elevation.  Notify office that you may have a blood clot.    Drainage oozing from the incision  Pulling apart of the edges of the incision  Increase in overall " body temperature (greater than 100.8 degrees)  IV. Medications:   1. Anticoagulants: Hold any aspirin or other blood thinners for 3 days after surgery.    2. Stool Softeners: You will be at greater risk of constipation after surgery due to being less mobile and the pain medications.   Take stool softeners as instructed by your surgeon while on pain medications. Over the counter Colace 100 mg 1-2 capsules twice daily.   If stools become too loose or too frequent, please decreases the dosage or stop the stool softener.  If constipation occurs despite use of stool softeners, you are to continue the stool softeners and add a laxative (Milk of Magnesia 1 ounce daily as needed).  If no bowel movement occurs past 3 days, then purchase Magnesium citrate and drink 1/2 bottle every 8 hours (on ice tastes better) until success.  Drink plenty of fluids, and eat fruits and vegetables during your recovery time.    3. Pain Medications utilized after surgery are narcotics and the law requires that the following information be given to all patients that are prescribed narcotics:  CLASSIFICATION: Pain medications are called Opioids and are narcotics  LEGALITIES: It is illegal to share narcotics with others and to drive within 24 hours of taking narcotics  POTENTIAL SIDE EFFECTS: Potential side effects of opioids include: nausea, vomiting, itching, dizziness, drowsiness, dry mouth, constipation, and difficulty urinating.  POTENTIAL ADVERSE EFFECTS:   Opioid tolerance can develop with use of pain medications and this simply means that it requires more and more of the medication to control pain; however, this is seen more in patients that use opioids for longer periods of time.  Opioid dependence can develop with use of Opioids and this simply means that to stop the medication can cause withdrawal symptoms; however, this is seen with patients that use Opioids for longer periods of time.  Opioid addiction can develop with use of Opioids  "and the incidence of this is very unlikely in patients who take the medications as ordered and stop the medications as instructed.  Opioid overdose can be dangerous, but is unlikely when the medication is taken as ordered and stopped when ordered. It is important not to mix opioids with alcohol or with and type of sedative such as Benadryl as this can lead to over sedation and respiratory difficulty.  DOSAGE:   Pain medications will need to be taken consistently for the first few days to decrease pain and promote adequate pain relief and participation in physical therapy.  After the initial surgical pain begins to resolve, you may begin to decrease the pain medication. Pain medications will be tapered to lesser dosages as you are further from your surgical day.  No pain medications will be provided after 3 months from surgery.     Refills will not be given by the office during evening hours, or weekends.  To seek refills on pain medications during the post-operative period, you must call the office 48 hours in advance to request the refill. The office will then notify you when to  the prescription. DO NOT wait until you are out of the medication to request a refill.  They can not be \"called in\" to the pharmacy.      V. FOLLOW-UP VISITS:  You will need to follow up in the office with Dr. Eugene in 10-14 days.  Please call [phone] if you need to confirm or reschedule your appointment time.   If you have any concerns or suspected complications including fevers, chills, drainage, increasing pain, increasing numbness, increasing weakness prior to your follow up visit, please call your surgeons office or go to ER. Do not wait until your appointment time if you suspect complications. These will need to be addressed in the office promptly.   "

## 2024-09-25 NOTE — THERAPY EVALUATION
"Patient Name: Lesli Ambrocio  : 1955    MRN: [id number]                              Today's Date: 2024       Admit Date: 2024    Visit Dx:     ICD-10-CM ICD-9-CM   1. S/P lumbar fusion  Z98.1 V45.4     Patient Active Problem List   Diagnosis    Family history of colon cancer    S/P lumbar fusion     Past Medical History:   Diagnosis Date    Arthritis     Back pain     Colon abnormality     \"LOOPY COLON\" PER PATIENT    PONV (postoperative nausea and vomiting)     Right knee pain      Past Surgical History:   Procedure Laterality Date    BACK SURGERY      BILATERAL BREAST REDUCTION Bilateral 10/3/2022    Procedure: BILATERAL BREAST REDUCTION;  Surgeon: CHARLEE Day MD;  Location: [facility] OR;  Service: Plastics;  Laterality: Bilateral;    BLEPHAROPLASTY Bilateral 2023    Procedure: BILATERAL UPPER LID BLEPHAROPLASTY;  Surgeon: CHARLEE Day MD;  Location: [facility] MAIN OR;  Service: Plastics;  Laterality: Bilateral;    CARPAL TUNNEL RELEASE Bilateral     COLONOSCOPY      COLONOSCOPY N/A 2024    Procedure: COLONOSCOPY to cecum;  Surgeon: Shar Young MD;  Location: [facility] ENDOSCOPY;  Service: General;  Laterality: N/A;  pre: family hx of colon cancer  post:diverticulosis    ALLAN'S NEUROMA EXCISION Bilateral     TRIGGER FINGER RELEASE Left       General Information       Row Name 24          Physical Therapy Time and Intention    Document Type evaluation  -BR     Mode of Treatment physical therapy  -BR       Row Name 24 164          General Information    Patient Profile Reviewed yes  -BR     Prior Level of Function independent:;all household mobility;community mobility;gait;transfer;driving  -BR     Existing Precautions/Restrictions spinal  -BR       Row Name 24 1649          Living Environment    People in Home spouse  -BR       Row Name 24 1649          Home Main Entrance    Number of Stairs, Main Entrance two  -BR     Stair Railings, Main " Entrance none  -BR       Row Name 09/25/24 1649          Cognition    Orientation Status (Cognition) oriented x 4  -BR               User Key  (r) = Recorded By, (t) = Taken By, (c) = Cosigned By      Initials Name Provider Type    Zena Horta PT Physical Therapist                   Mobility       Row Name 09/25/24 1656          Bed Mobility    Bed Mobility supine-sit  -BR     All Activities, Spicer (Bed Mobility) standby assist  -BR     Comment, (Bed Mobility) Pt was instructed in log roll technique.  -BR       Row Name 09/25/24 1656          Sit-Stand Transfer    Sit-Stand Spicer (Transfers) standby assist  -BR     Assistive Device (Sit-Stand Transfers) walker, front-wheeled  -BR       Row Name 09/25/24 1656          Gait/Stairs (Locomotion)    Spicer Level (Gait) supervision  -BR     Assistive Device (Gait) walker, front-wheeled  -BR     Patient was able to Ambulate yes  -BR     Distance in Feet (Gait) 400  -BR     Spicer Level (Stairs) supervision  -BR     Number of Steps (Stairs) 4  -BR     Ascending Technique (Stairs) step-over-step  -BR     Descending Technique (Stairs) step-over-step  -BR               User Key  (r) = Recorded By, (t) = Taken By, (c) = Cosigned By      Initials Name Provider Type    Zena Horta PT Physical Therapist                   Obj/Interventions       Row Name 09/25/24 1657          Range of Motion Comprehensive    General Range of Motion bilateral lower extremity ROM WNL  -BR       Row Name 09/25/24 1657          Strength Comprehensive (MMT)    Comment, General Manual Muscle Testing (MMT) Assessment BLE strength grossly 4-/5  -BR       Row Name 09/25/24 1657          Balance    Static Standing Balance supervision  -BR     Position/Device Used, Standing Balance supported;walker, rolling  -BR       Row Name 09/25/24 1657          Sensory Assessment (Somatosensory)    Sensory Assessment (Somatosensory) sensation intact  -BR               User  Key  (r) = Recorded By, (t) = Taken By, (c) = Cosigned By      Initials Name Provider Type    BR Zena Marrero, PT Physical Therapist                   Goals/Plan    No documentation.                  Clinical Impression       Row Name 09/25/24 1658          Pain    Pretreatment Pain Rating 2/10  -BR     Posttreatment Pain Rating 4/10  -BR     Pain Location - back  -BR       Row Name 09/25/24 1658          Plan of Care Review    Plan of Care Reviewed With patient;spouse  -BR     Outcome Evaluation Pt presents as a 70 y/o F POD # 1 for L3-5 hardware removal and L3-4 TLIF per Dr. Eugene. Procedure was tolerated well. Pt has hx 4 prior spinal surgeries. Pt A and O x 4. At baseline, pt lives with spouse in [facility]  with 2 FELICIANO and she is typically independent with community mobility without AD. This date, pt reinforced spinal precautions and reviewed log roll technique. Pt ambulated 400 feet with rolling walker with supervision and pt ascended and descended 4 steps with SBA of 1. Pt will need a rolling walker for home- CM notified. PT will complete PT orders. PT recommendation is Home with Assist.  -BR       Row Name 09/25/24 1658          Therapy Assessment/Plan (PT)    Criteria for Skilled Interventions Met (PT) no;no problems identified which require skilled intervention  -BR     Therapy Frequency (PT) evaluation only  -BR       Row Name 09/25/24 1658          Vital Signs    O2 Delivery Pre Treatment room air  -BR     Pre Patient Position Supine  -BR     Intra Patient Position Standing  -BR     Post Patient Position Sitting  -BR       Row Name 09/25/24 1658          Positioning and Restraints    Pre-Treatment Position in bed  -BR     Post Treatment Position chair  -BR     In Chair notified nsg;reclined;call light within reach;encouraged to call for assist;with family/caregiver;legs elevated  -BR               User Key  (r) = Recorded By, (t) = Taken By, (c) = Cosigned By      Initials Name Provider Type    BR  Zena Marrero, PT Physical Therapist                   Outcome Measures       Row Name 09/25/24 1703 09/25/24 0802       How much help from another person do you currently need...    Turning from your back to your side while in flat bed without using bedrails? 4  -BR 4  -LP    Moving from lying on back to sitting on the side of a flat bed without bedrails? 4  -BR 4  -LP    Moving to and from a bed to a chair (including a wheelchair)? 3  -BR 4  -LP    Standing up from a chair using your arms (e.g., wheelchair, bedside chair)? 4  -BR 4  -LP    Climbing 3-5 steps with a railing? 4  -BR 3  -LP    To walk in hospital room? 4  -BR 3  -LP    AM-PAC 6 Clicks Score (PT) 23  -BR 22  -LP    Highest Level of Mobility Goal 7 --> Walk 25 feet or more  -BR 7 --> Walk 25 feet or more  -LP      Row Name 09/25/24 1703 09/25/24 1542       Functional Assessment    Outcome Measure Options AM-PAC 6 Clicks Basic Mobility (PT)  -BR AM-PAC 6 Clicks Daily Activity (OT)  -MS              User Key  (r) = Recorded By, (t) = Taken By, (c) = Cosigned By      Initials Name Provider Type    MS Melvi Jackson, OT Occupational Therapist    Zena Horta, PT Physical Therapist    Ana Luisa Garcia LPN Licensed Nurse                                   PT Recommendation and Plan     Plan of Care Reviewed With: patient, spouse  Outcome Evaluation: Pt presents as a 70 y/o F POD # 1 for L3-5 hardware removal and L3-4 TLIF per Dr. Eugene. Procedure was tolerated well. Pt has hx 4 prior spinal surgeries. Pt A and O x 4. At baseline, pt lives with spouse in [facility]  with 2 FELICIANO and she is typically independent with community mobility without AD. This date, pt reinforced spinal precautions and reviewed log roll technique. Pt ambulated 400 feet with rolling walker with supervision and pt ascended and descended 4 steps with SBA of 1. Pt will need a rolling walker for home- CM notified. PT will complete PT orders. PT recommendation is Home with  Assist.     Time Calculation:         PT Charges       Row Name 09/25/24 1703             Time Calculation    Start Time 1041  -BR      Stop Time 1103  -BR      Time Calculation (min) 22 min  -BR      PT Received On 09/25/24  -BR         Time Calculation- PT    Total Timed Code Minutes- PT 0 minute(s)  -BR                User Key  (r) = Recorded By, (t) = Taken By, (c) = Cosigned By      Initials Name Provider Type    BR Zena Marrero, PT Physical Therapist                  Therapy Charges for Today       Code Description Service Date Service Provider Modifiers Qty    [id number] HC PT EVAL LOW COMPLEXITY 4 9/25/2024 Zena Marrero, PT GP 1            PT G-Codes  Outcome Measure Options: AM-PAC 6 Clicks Basic Mobility (PT)  AM-PAC 6 Clicks Score (PT): 23  AM-PAC 6 Clicks Score (OT): 17  PT Discharge Summary  Anticipated Discharge Disposition (PT): home with assist    Zena Marrero, PT  9/25/2024

## 2024-09-25 NOTE — THERAPY EVALUATION
"Patient Name: Lesli Ambrocio  : 1955    MRN: [id number]                              Today's Date: 2024       Admit Date: 2024    Visit Dx:     ICD-10-CM ICD-9-CM   1. S/P lumbar fusion  Z98.1 V45.4     Patient Active Problem List   Diagnosis    Family history of colon cancer    S/P lumbar fusion     Past Medical History:   Diagnosis Date    Arthritis     Back pain     Colon abnormality     \"LOOPY COLON\" PER PATIENT    PONV (postoperative nausea and vomiting)     Right knee pain      Past Surgical History:   Procedure Laterality Date    BACK SURGERY      BILATERAL BREAST REDUCTION Bilateral 10/3/2022    Procedure: BILATERAL BREAST REDUCTION;  Surgeon: CHARLEE Day MD;  Location: [facility] MAIN OR;  Service: Plastics;  Laterality: Bilateral;    BLEPHAROPLASTY Bilateral 2023    Procedure: BILATERAL UPPER LID BLEPHAROPLASTY;  Surgeon: CHARLEE Day MD;  Location: [facility] MAIN OR;  Service: Plastics;  Laterality: Bilateral;    CARPAL TUNNEL RELEASE Bilateral     COLONOSCOPY      COLONOSCOPY N/A 2024    Procedure: COLONOSCOPY to cecum;  Surgeon: Shar Young MD;  Location: [facility] ENDOSCOPY;  Service: General;  Laterality: N/A;  pre: family hx of colon cancer  post:diverticulosis    ALLAN'S NEUROMA EXCISION Bilateral     TRIGGER FINGER RELEASE Left       General Information       Row Name 24 1536          OT Time and Intention    Document Type evaluation  -MS     Mode of Treatment occupational therapy  -MS       Row Name 24 1536          General Information    Patient Profile Reviewed yes  -MS     Prior Level of Function independent:;ADL's;community mobility;all household mobility;driving  -MS     Existing Precautions/Restrictions fall;spinal  -MS     Barriers to Rehab none identified  -MS       Row Name 24 1536          Occupational Profile    Reason for Services/Referral (Occupational Profile) Pt is a 70 y/o F admitted to [facility] 24 for L3-L4 TLIF with removal of " previus hardware L3-L5 by Dr. Eugene 9/24/24, POD#1. At baseline pt resides with spouse in 3 story home, [address]. Pt typically (I) with ADLs, (I) with mobility without AD and is an active . Pt has walkin shower with bench and long handled shower head.  -MS     Environmental Supports and Barriers (Occupational Profile) supportive family  -MS       Row Name 09/25/24 1536          Living Environment    People in Home spouse  -MS       Row Name 09/25/24 1536          Home Main Entrance    Number of Stairs, Main Entrance one  -MS       Row Name 09/25/24 1536          Stairs Within Home, Primary    Number of Stairs, Within Home, Primary twelve  3 story home, bedroom is on second floor  -MS       Row Name 09/25/24 1536          Cognition    Orientation Status (Cognition) oriented x 4  -MS       Row Name 09/25/24 1536          Safety Issues, Functional Mobility    Impairments Affecting Function (Mobility) endurance/activity tolerance;pain;strength  -MS               User Key  (r) = Recorded By, (t) = Taken By, (c) = Cosigned By      Initials Name Provider Type    MS Melvi Jackson, OT Occupational Therapist                     Mobility/ADL's       Row Name 09/25/24 1537          Bed Mobility    Bed Mobility bed mobility (all) activities  -MS     All Activities, [place] (Bed Mobility) unable to assess  -MS     Comment, (Bed Mobility) sitting up in chair upon arrival  -MS       Row Name 09/25/24 1537          Transfers    Transfers sit-stand transfer  -MS       Row Name 09/25/24 1537          Sit-Stand Transfer    Sit-Stand [place] (Transfers) standby assist;verbal cues  -MS     Assistive Device (Sit-Stand Transfers) walker, front-wheeled  -MS     Comment, (Sit-Stand Transfer) verbal cues for hand placement  -MS       Row Name 09/25/24 1537          Functional Mobility    Patient was able to Ambulate yes  -MS               User Key  (r) = Recorded By, (t) = Taken By, (c) = Cosigned By      Initials Name  Provider Type    MS Melvi Jackson OT Occupational Therapist                   Obj/Interventions       Row Name 09/25/24 1537          Sensory Assessment (Somatosensory)    Sensory Assessment (Somatosensory) UE sensation intact  -MS       Row Name 09/25/24 1537          Vision Assessment/Intervention    Visual Impairment/Limitations WNL  -MS       Row Name 09/25/24 1537          Range of Motion Comprehensive    General Range of Motion bilateral upper extremity ROM WNL  -MS       Row Name 09/25/24 1537          Strength Comprehensive (MMT)    Comment, General Manual Muscle Testing (MMT) Assessment BUE grossly 4-/5  -MS       Row Name 09/25/24 1537          Balance    Balance Assessment sitting static balance;sitting dynamic balance;standing static balance;standing dynamic balance  -MS     Static Sitting Balance modified independence  -MS     Dynamic Sitting Balance modified independence  -MS     Position, Sitting Balance supported;sitting edge of bed  -MS     Static Standing Balance supervision  -MS     Dynamic Standing Balance standby assist  -MS     Position/Device Used, Standing Balance unsupported  -MS               User Key  (r) = Recorded By, (t) = Taken By, (c) = Cosigned By      Initials Name Provider Type    MS Melvi Jackson OT Occupational Therapist                   Goals/Plan       Row Name 09/25/24 1541          Dressing Goal 1 (OT)    Activity/Device (Dressing Goal 1, OT) dressing skills, all;lower body dressing;long-handled shoe horn;reacher;sock-aid;dressing stick  -MS     [place]/Cues Needed (Dressing Goal 1, OT) modified independence  -MS     Time Frame (Dressing Goal 1, OT) long term goal (LTG);2 weeks  -MS     Progress/Outcome (Dressing Goal 1, OT) new goal  -MS       Row Name 09/25/24 1541          Toileting Goal 1 (OT)    Activity/Device (Toileting Goal 1, OT) toileting skills, all;toilet paper aid  -MS     [place] Level/Cues Needed (Toileting Goal 1, OT) modified independence  -MS      Time Frame (Toileting Goal 1, OT) long term goal (LTG);2 weeks  -MS     Progress/Outcome (Toileting Goal 1, OT) new goal  -MS       Row Name 09/25/24 1541          Problem Specific Goal 1 (OT)    Problem Specific Goal 1 (OT) increase standing activity tolerance needed for ADL routine >5 minutes without rest break  -MS     Time Frame (Problem Specific Goal 1, OT) long term goal (LTG);2 weeks  -MS     Progress/Outcome (Problem Specific Goal 1, OT) new goal  -MS       Row Name 09/25/24 1541          Therapy Assessment/Plan (OT)    Planned Therapy Interventions (OT) activity tolerance training;adaptive equipment training;BADL retraining;functional balance retraining;IADL retraining;neuromuscular control/coordination retraining;occupation/activity based interventions;passive ROM/stretching;patient/caregiver education/training;ROM/therapeutic exercise;transfer/mobility retraining;strengthening exercise  -MS               User Key  (r) = Recorded By, (t) = Taken By, (c) = Cosigned By      Initials Name Provider Type    Melvi Knutson, MARY Occupational Therapist                   Clinical Impression       Row Name 09/25/24 1538          Pain Assessment    Pain Intervention(s) Repositioned;Ambulation/increased activity;Emotional support  -MS     Additional Documentation Pain Scale: FACES Pre/Post-Treatment (Group)  -MS       Row Name 09/25/24 9338          Pain Scale: FACES Pre/Post-Treatment    Pain: FACES Scale, Pretreatment 4-->hurts little more  -MS     Posttreatment Pain Rating 4-->hurts little more  -MS     Pain Location incisional  -MS     Pain Location - back  -MS       Row Name 09/25/24 4330          Plan of Care Review    Plan of Care Reviewed With patient;spouse  -MS     Progress no change  -MS     Outcome Evaluation Pt is a 70 y/o F admitted to [facility] 9/24/24 for L3-L4 TLIF with removal of previus hardware L3-L5 by Dr. Eugene 9/24/24, POD#1. At baseline pt resides with spouse in 3 [place] home, 1 [facility]. Pt  typically (I) with ADLs, (I) with mobility without AD and is an active . Pt has walkin shower with bench and long handled shower head. This date pt A&Ox4 on RA sitting up in chair upon arrival. Pt educated on spinal precautions, provided educational handout and educated on compensatory strategies, AE utilized for ADL routine to adhere to spinal precautions. Pt demo good activity tolerance with mobility, requiring SBA to come to standing and ambulates within room with SBA with RW and CGA without RW no LOB. Pt encouraged to utilize RW at home for energy conservation, verbalized understanding. Pt inquired about ADL routine including bathing, toileting and dressing, provided additional education. Anticipate pt to progress well, likely safe to dc home with spouse assistance and HHOT. OT will follow within acute setting.  -MS       Row Name 09/25/24 5070          Therapy Plan Review/Discharge Plan (OT)    Anticipated Discharge Disposition (OT) home with assist;home with home health  -MS       Row Name 09/25/24 1536          Vital Signs    O2 Delivery Pre Treatment room air  -MS     O2 Delivery Intra Treatment room air  -MS     O2 Delivery Post Treatment room air  -MS     Pre Patient Position Sitting  -MS     Intra Patient Position Standing  -MS     Post Patient Position Sitting  -MS       Row Name 09/25/24 1536          Positioning and Restraints    Pre-Treatment Position sitting in chair/recliner  -MS     Post Treatment Position chair  -MS     In Chair notified nsg;reclined;call light within reach;encouraged to call for assist;with family/caregiver;legs elevated  -MS               User Key  (r) = Recorded By, (t) = Taken By, (c) = Cosigned By      Initials Name Provider Type    Melvi Knutson, OT Occupational Therapist                   Outcome Measures       Row Name 09/25/24 0146          How much help from another is currently needed...    Putting on and taking off regular lower body clothing? 2  -MS      Bathing (including washing, rinsing, and drying) 2  -MS     Toileting (which includes using toilet bed pan or urinal) 2  -MS     Putting on and taking off regular upper body clothing 3  -MS     Taking care of personal grooming (such as brushing teeth) 4  -MS     Eating meals 4  -MS     AM-PAC 6 Clicks Score (OT) 17  -MS       Row Name 09/25/24 0802          How much help from another person do you currently need...    Turning from your back to your side while in flat bed without using bedrails? 4  -LP     Moving from lying on back to sitting on the side of a flat bed without bedrails? 4  -LP     Moving to and from a bed to a chair (including a wheelchair)? 4  -LP     Standing up from a chair using your arms (e.g., wheelchair, bedside chair)? 4  -LP     Climbing 3-5 steps with a railing? 3  -LP     To walk in hospital room? 3  -LP     AM-PAC 6 Clicks Score (PT) 22  -LP     Highest Level of Mobility Goal 7 --> Walk 25 feet or more  -LP       Row Name 09/25/24 1542          Functional Assessment    Outcome Measure Options AM-PAC 6 Clicks Daily Activity (OT)  -MS               User Key  (r) = Recorded By, (t) = Taken By, (c) = Cosigned By      Initials Name Provider Type    MS Melvi Jackson, OT Occupational Therapist    Ana Luisa Garcia LPN Licensed Nurse                    Occupational Therapy Education       Title: PT OT SLP Therapies (Done)       Topic: Occupational Therapy (Done)       Point: ADL training (Done)       Description:   Instruct learner(s) on proper safety adaptation and remediation techniques during self care or transfers.   Instruct in proper use of assistive devices.                  Learning Progress Summary             Patient Acceptance, E,TB, VU by MS at 9/25/2024 1542                         Point: Precautions (Done)       Description:   Instruct learner(s) on prescribed precautions during self-care and functional transfers.                  Learning Progress Summary             Patient  Acceptance, E,TB, VU by MS at 9/25/2024 1542                         Point: Body mechanics (Done)       Description:   Instruct learner(s) on proper positioning and spine alignment during self-care, functional mobility activities and/or exercises.                  Learning Progress Summary             Patient Acceptance, E,TB, VU by MS at 9/25/2024 1542                                         User Key       Initials Effective Dates Name Provider Type Discipline    MS 07/13/22 -  Melvi Jackson OT Occupational Therapist OT                  OT Recommendation and Plan  Planned Therapy Interventions (OT): activity tolerance training, adaptive equipment training, BADL retraining, functional balance retraining, IADL retraining, neuromuscular control/coordination retraining, occupation/activity based interventions, passive ROM/stretching, patient/caregiver education/training, ROM/therapeutic exercise, transfer/mobility retraining, strengthening exercise  Plan of Care Review  Plan of Care Reviewed With: patient, spouse  Progress: no change  Outcome Evaluation: Pt is a 68 y/o F admitted to [facility] 9/24/24 for L3-L4 TLIF with removal of previus hardware L3-L5 by Dr. Eugene 9/24/24, POD#1. At baseline pt resides with spouse in 3 [place] home, 1 [facility]. Pt typically (I) with ADLs, (I) with mobility without AD and is an active . Pt has walkin shower with bench and long handled shower head. This date pt A&Ox4 on RA sitting up in chair upon arrival. Pt educated on spinal precautions, provided educational handout and educated on compensatory strategies, AE utilized for ADL routine to adhere to spinal precautions. Pt demo good activity tolerance with mobility, requiring SBA to come to standing and ambulates within room with SBA with RW and CGA without RW no LOB. Pt encouraged to utilize RW at home for energy conservation, verbalized understanding. Pt inquired about ADL routine including bathing, toileting and dressing, provided  additional education. Anticipate pt to progress well, likely safe to dc home with spouse assistance and HHOT. OT will follow within acute setting.     Time Calculation:                   Melvi Jackson OT  9/25/2024

## 2024-09-25 NOTE — PLAN OF CARE
Goal Outcome Evaluation:  Plan of Care Reviewed With: patient, spouse        Progress: no change  Outcome Evaluation: Pt is a 68 y/o F admitted to [facility] 9/24/24 for L3-L4 TLIF with removal of previus hardware L3-L5 by Dr. Eugene 9/24/24, POD#1. At baseline pt resides with spouse in 3 [place] home, 1 [facility]. Pt typically (I) with ADLs, (I) with mobility without AD and is an active . Pt has walkin shower with bench and long handled shower head. This date pt A&Ox4 on RA sitting up in chair upon arrival. Pt educated on spinal precautions, provided educational handout and educated on compensatory strategies, AE utilized for ADL routine to adhere to spinal precautions. Pt demo good activity tolerance with mobility, requiring SBA to come to standing and ambulates within room with SBA with RW and CGA without RW no LOB. Pt encouraged to utilize RW at home for energy conservation, verbalized understanding. Pt inquired about ADL routine including bathing, toileting and dressing, provided additional education. Anticipate pt to progress well, likely safe to dc home with spouse assistance and HHOT. OT will follow within acute setting.      Anticipated Discharge Disposition (OT): home with assist, home with home health

## 2024-09-25 NOTE — PLAN OF CARE
Pt has hx of spinal sx, comfortable with spinal precautions and ADLs. Please see PT evaluation for mobility. Pt is dc home with spouse, OT completing orders.

## 2024-09-25 NOTE — PROGRESS NOTES
Procedure(s):  MINIMAL ACCESSABLE SURGICAL TECHNIQUE TRANSFORAMINAL LUMBAR FUSION AT LUMBAR THREE-FOUR, REVISION OF LUMBAR FOUR  PEDICLE SCREW, REMOVAL OF LUMBAR FIVE SCREWS, INSTRUMENTATION WITH INSTRUMENTED POSTERIOR SPINAL FUSION LUMBAR THREE TO FOUR     LOS: 1 day     Subjective :   Complains of pain in the back with rolling or sitting up.  When she is lying still or while she is walking she says  the pain is not bad.  The pain medicine is helping.  She says that her preoperative hip pain and left leg numbness is gone.    Objective :    Vital signs in last 24 hours:  Vitals:    09/24/24 1751 09/24/24 2129 09/24/24 2348 09/25/24 0452   BP: 167/73 104/61 116/62 166/70   BP Location:  Left arm Left arm Left arm   Patient Position:  Lying Lying Lying   Pulse: 76 96 70 75   Resp:  15 17 15   Temp: 97.8 °F (36.6 °C) 97.8 °F (36.6 °C) 97.9 °F (36.6 °C) 98.1 °F (36.7 °C)   TempSrc: Oral Oral Oral Oral   SpO2: 99% 92% 99% 100%   Weight:       Height:           PHYSICAL EXAM:  Patient is calm, in no acute distress, awake and oriented x 3.  Some bloody drainage from the left sided incision.  No signs of infection.  Swelling is appropriate in amount.  Ecchymosis is appropriate in amount.  5 out of 5 strength to ankle dorsiflexion, plantarflexion, great toe extension.    LABS:  Results from last 7 days   Lab Units 09/24/24  2319   WBC 10*3/mm3 12.69*   HEMOGLOBIN g/dL 10.2*   HEMATOCRIT % 31.3*   PLATELETS 10*3/mm3 173     Results from last 7 days   Lab Units 09/24/24  2319   SODIUM mmol/L 140   POTASSIUM mmol/L 4.1   CHLORIDE mmol/L 107   CO2 mmol/L 24.1   BUN mg/dL 15   CREATININE mg/dL 0.66   GLUCOSE mg/dL 161*   CALCIUM mg/dL 8.5*             ASSESSMENT:  Status post Procedure(s):  MINIMAL ACCESSABLE SURGICAL TECHNIQUE TRANSFORAMINAL LUMBAR FUSION AT LUMBAR THREE-FOUR, REVISION OF LUMBAR FOUR  PEDICLE SCREW, REMOVAL OF LUMBAR FIVE SCREWS, INSTRUMENTATION WITH INSTRUMENTED POSTERIOR SPINAL FUSION LUMBAR THREE TO FOUR       Plan:  Continue Physical Therapy,  Continue SCDs,   Dispo planning for possible DC to home today depending on how she does with therapy and with her pain.  Otherwise plan to discharge tomorrow.    Patient has a mobility limitation that significantly impairs their ability to participate in one or more mobility- related activities of daily living. The patient is able to safely use the walker. The functional mobility deficit can be sufficiently resolved by use of a walker     Evelio Eugene MD    Date: 9/25/2024  Time: 08:08 EDT

## 2024-09-26 LAB
QT INTERVAL: 437 MS
QTC INTERVAL: 414 MS

## 2024-09-26 NOTE — CASE MANAGEMENT/SOCIAL WORK
Case Management Discharge Note      Final Note: Routine home         Selected Continued Care - Discharged on 9/25/2024 Admission date: 9/24/2024 - Discharge disposition: Home or Self Care             Transportation Services  Private: Car    Final Discharge Disposition Code: 01 - home or self-care

## 2024-11-26 ENCOUNTER — HOSPITAL ENCOUNTER (EMERGENCY)
Facility: HOSPITAL | Age: 69
Discharge: HOME | End: 2024-11-26
Attending: EMERGENCY MEDICINE | Admitting: EMERGENCY MEDICINE
Payer: MEDICARE

## 2024-11-26 VITALS
TEMPERATURE: 99 F | SYSTOLIC BLOOD PRESSURE: 159 MMHG | DIASTOLIC BLOOD PRESSURE: 63 MMHG | HEIGHT: 62 IN | WEIGHT: 156.53 LBS | RESPIRATION RATE: 18 BRPM | OXYGEN SATURATION: 96 % | BODY MASS INDEX: 28.8 KG/M2 | HEART RATE: 102 BPM

## 2024-11-26 DIAGNOSIS — R19.7 BLOODY DIARRHEA: Primary | ICD-10-CM

## 2024-11-26 LAB
ABO GROUP BLD: NORMAL
ADV 40+41 DNA STL QL NAA+NON-PROBE: NOT DETECTED
ALBUMIN SERPL-MCNC: 4.4 G/DL (ref 3.5–5.2)
ALBUMIN/GLOB SERPL: 1.5 G/DL
ALP SERPL-CCNC: 83 U/L (ref 39–117)
ALT SERPL W P-5'-P-CCNC: 16 U/L (ref 1–33)
ANION GAP SERPL CALCULATED.3IONS-SCNC: 12.3 MMOL/L (ref 5–15)
AST SERPL-CCNC: 20 U/L (ref 1–32)
ASTRO TYP 1-8 RNA STL QL NAA+NON-PROBE: NOT DETECTED
BASOPHILS # BLD AUTO: 0.04 10*3/MM3 (ref 0–0.2)
BASOPHILS NFR BLD AUTO: 0.4 % (ref 0–1.5)
BILIRUB SERPL-MCNC: 0.5 MG/DL (ref 0–1.2)
BLD GP AB SCN SERPL QL: NEGATIVE
BUN SERPL-MCNC: 12 MG/DL (ref 8–23)
BUN/CREAT SERPL: 17.1 (ref 7–25)
C CAYETANENSIS DNA STL QL NAA+NON-PROBE: NOT DETECTED
C COLI+JEJ+UPSA DNA STL QL NAA+NON-PROBE: NOT DETECTED
CALCIUM SPEC-SCNC: 9.4 MG/DL (ref 8.6–10.5)
CHLORIDE SERPL-SCNC: 104 MMOL/L (ref 98–107)
CO2 SERPL-SCNC: 24.7 MMOL/L (ref 22–29)
CREAT SERPL-MCNC: 0.7 MG/DL (ref 0.57–1)
CRYPTOSP DNA STL QL NAA+NON-PROBE: NOT DETECTED
D-LACTATE SERPL-SCNC: 1.6 MMOL/L (ref 0.5–2)
DEPRECATED RDW RBC AUTO: 43.2 FL (ref 37–54)
E HISTOLYT DNA STL QL NAA+NON-PROBE: NOT DETECTED
EAEC PAA PLAS AGGR+AATA ST NAA+NON-PRB: NOT DETECTED
EC STX1+STX2 GENES STL QL NAA+NON-PROBE: NOT DETECTED
EGFRCR SERPLBLD CKD-EPI 2021: 94.3 ML/MIN/1.73
EOSINOPHIL # BLD AUTO: 0.03 10*3/MM3 (ref 0–0.4)
EOSINOPHIL NFR BLD AUTO: 0.3 % (ref 0.3–6.2)
EPEC EAE GENE STL QL NAA+NON-PROBE: NOT DETECTED
ERYTHROCYTE [DISTWIDTH] IN BLOOD BY AUTOMATED COUNT: 13.3 % (ref 12.3–15.4)
ETEC LTA+ST1A+ST1B TOX ST NAA+NON-PROBE: NOT DETECTED
G LAMBLIA DNA STL QL NAA+NON-PROBE: NOT DETECTED
GLOBULIN UR ELPH-MCNC: 3 GM/DL
GLUCOSE SERPL-MCNC: 136 MG/DL (ref 65–99)
HCT VFR BLD AUTO: 40.6 % (ref 34–46.6)
HEMOCCULT STL QL: POSITIVE
HGB BLD-MCNC: 13.6 G/DL (ref 12–15.9)
HOLD SPECIMEN: NORMAL
HOLD SPECIMEN: NORMAL
IMM GRANULOCYTES # BLD AUTO: 0.02 10*3/MM3 (ref 0–0.05)
IMM GRANULOCYTES NFR BLD AUTO: 0.2 % (ref 0–0.5)
LYMPHOCYTES # BLD AUTO: 1.42 10*3/MM3 (ref 0.7–3.1)
LYMPHOCYTES NFR BLD AUTO: 15.7 % (ref 19.6–45.3)
MCH RBC QN AUTO: 30 PG (ref 26.6–33)
MCHC RBC AUTO-ENTMCNC: 33.5 G/DL (ref 31.5–35.7)
MCV RBC AUTO: 89.4 FL (ref 79–97)
MONOCYTES # BLD AUTO: 0.71 10*3/MM3 (ref 0.1–0.9)
MONOCYTES NFR BLD AUTO: 7.9 % (ref 5–12)
NEUTROPHILS NFR BLD AUTO: 6.82 10*3/MM3 (ref 1.7–7)
NEUTROPHILS NFR BLD AUTO: 75.5 % (ref 42.7–76)
NOROVIRUS GI+II RNA STL QL NAA+NON-PROBE: NOT DETECTED
NRBC BLD AUTO-RTO: 0 /100 WBC (ref 0–0.2)
P SHIGELLOIDES DNA STL QL NAA+NON-PROBE: NOT DETECTED
PLATELET # BLD AUTO: 259 10*3/MM3 (ref 140–450)
PMV BLD AUTO: 10.1 FL (ref 6–12)
POTASSIUM SERPL-SCNC: 4.1 MMOL/L (ref 3.5–5.2)
PROT SERPL-MCNC: 7.4 G/DL (ref 6–8.5)
RBC # BLD AUTO: 4.54 10*6/MM3 (ref 3.77–5.28)
RH BLD: POSITIVE
RVA RNA STL QL NAA+NON-PROBE: NOT DETECTED
S ENT+BONG DNA STL QL NAA+NON-PROBE: NOT DETECTED
SAPO I+II+IV+V RNA STL QL NAA+NON-PROBE: NOT DETECTED
SHIGELLA SP+EIEC IPAH ST NAA+NON-PROBE: NOT DETECTED
SODIUM SERPL-SCNC: 141 MMOL/L (ref 136–145)
T&S EXPIRATION DATE: NORMAL
V CHOL+PARA+VUL DNA STL QL NAA+NON-PROBE: NOT DETECTED
V CHOLERAE DNA STL QL NAA+NON-PROBE: NOT DETECTED
WBC NRBC COR # BLD AUTO: 9.04 10*3/MM3 (ref 3.4–10.8)
WHOLE BLOOD HOLD COAG: NORMAL
WHOLE BLOOD HOLD SPECIMEN: NORMAL
Y ENTEROCOL DNA STL QL NAA+NON-PROBE: NOT DETECTED

## 2024-11-26 PROCEDURE — 83605 ASSAY OF LACTIC ACID: CPT

## 2024-11-26 PROCEDURE — 36415 COLL VENOUS BLD VENIPUNCTURE: CPT

## 2024-11-26 PROCEDURE — 87507 IADNA-DNA/RNA PROBE TQ 12-25: CPT | Performed by: EMERGENCY MEDICINE

## 2024-11-26 PROCEDURE — 99283 EMERGENCY DEPT VISIT LOW MDM: CPT

## 2024-11-26 PROCEDURE — 86901 BLOOD TYPING SEROLOGIC RH(D): CPT | Performed by: EMERGENCY MEDICINE

## 2024-11-26 PROCEDURE — 80053 COMPREHEN METABOLIC PANEL: CPT

## 2024-11-26 PROCEDURE — 85025 COMPLETE CBC W/AUTO DIFF WBC: CPT

## 2024-11-26 PROCEDURE — 86900 BLOOD TYPING SEROLOGIC ABO: CPT | Performed by: EMERGENCY MEDICINE

## 2024-11-26 PROCEDURE — 25810000003 SODIUM CHLORIDE 0.9 % SOLUTION: Performed by: EMERGENCY MEDICINE

## 2024-11-26 PROCEDURE — 86850 RBC ANTIBODY SCREEN: CPT | Performed by: EMERGENCY MEDICINE

## 2024-11-26 PROCEDURE — 82272 OCCULT BLD FECES 1-3 TESTS: CPT | Performed by: EMERGENCY MEDICINE

## 2024-11-26 RX ORDER — SODIUM CHLORIDE 0.9 % (FLUSH) 0.9 %
10 SYRINGE (ML) INJECTION AS NEEDED
Status: DISCONTINUED | OUTPATIENT
Start: 2024-11-26 | End: 2024-11-26 | Stop reason: HOSPADM

## 2024-11-26 RX ADMIN — SODIUM CHLORIDE 500 ML: 9 INJECTION, SOLUTION INTRAVENOUS at 12:39

## 2024-11-26 NOTE — DISCHARGE INSTRUCTIONS
ED testing did not show serious causes for bloody diarrhea.  Blood test did not show obvious infection or anemia.  Chemistries did not show dehydration or electrolyte disturbance.  Testing for various stool pathogens was negative.    I recommend plenty of fluids and bland diet till symptoms resolve.  Do not hesitate to return for increased pain, bleeding or as needed

## 2024-11-26 NOTE — ED NOTES
Patient arrives via pv for complaints of diarrhea. Patient states diarrhea started last night and she noticed that she had blood in her stool. No blood thinners.

## 2024-11-26 NOTE — ED PROVIDER NOTES
EMERGENCY DEPARTMENT ENCOUNTER  Room Number:  36/36  PCP: Belen Mulligan MD  Independent Historians: Patient,  at bedside      HPI:  Chief Complaint: had concerns including Black or Bloody Stool.  Prior back surgery    A complete HPI/ROS/PMH/PSH/SH/FH are unobtainable due to:   Chronic or social conditions impacting patient care (Social Determinants of Health):       Context: Lesli Ambrocio is a 69 y.o. female with a medical history of spinal arthritis who presents to the ED c/o acute abdominal cramping, diarrhea with rectal bleeding.  Symptoms began yesterday afternoon with diffuse abdominal cramping.  She had some semiformed stools that progressed to watery diarrhea that became bloody diarrhea.  She has had bloody diarrhea about 6 times since last night.  Denies any significant abdominal pain.  Denies recent fever.  She does report eating some eggs that may or may not have been bad.  Denies foreign travel or known ill exposure.      Review of prior external notes (non-ED) -and- Review of prior external test results outside of this encounter:   I reviewed prior medical records note patient has been seen recently by orthopedics and is status post lumbar fusion.      Prescription drug monitoring program review:         PAST MEDICAL HISTORY  Active Ambulatory Problems     Diagnosis Date Noted    Family history of colon cancer 01/29/2024    S/P lumbar fusion 09/24/2024     Resolved Ambulatory Problems     Diagnosis Date Noted    Macromastia 10/03/2022     Past Medical History:   Diagnosis Date    Arthritis     Back pain     Colon abnormality     PONV (postoperative nausea and vomiting)     Right knee pain          PAST SURGICAL HISTORY  Past Surgical History:   Procedure Laterality Date    BACK SURGERY      BILATERAL BREAST REDUCTION Bilateral 10/3/2022    Procedure: BILATERAL BREAST REDUCTION;  Surgeon: CHALREE Day MD;  Location: [facility];  Service: Plastics;  Laterality: Bilateral;     BLEPHAROPLASTY Bilateral 7/17/2023    Procedure: BILATERAL UPPER LID BLEPHAROPLASTY;  Surgeon: CHARLEE Day MD;  Location: [facility] MAIN OR;  Service: Plastics;  Laterality: Bilateral;    CARPAL TUNNEL RELEASE Bilateral     COLONOSCOPY      COLONOSCOPY N/A 5/29/2024    Procedure: COLONOSCOPY to cecum;  Surgeon: Shar Young MD;  Location: [facility] ENDOSCOPY;  Service: General;  Laterality: N/A;  pre: family hx of colon cancer  post:diverticulosis    LUMBAR LAMINECTOMY WITH FUSION N/A 9/24/2024    Procedure: MINIMAL ACCESSABLE SURGICAL TECHNIQUE TRANSFORAMINAL LUMBAR FUSION AT LUMBAR THREE-FOUR, REVISION OF LUMBAR FOUR  PEDICLE SCREW, REMOVAL OF LUMBAR FIVE SCREWS, INSTRUMENTATION WITH INSTRUMENTED POSTERIOR SPINAL FUSION LUMBAR THREE TO FOUR;  Surgeon: Evelio Eugene MD;  Location: [facility] MAIN OR;  Service: Orthopedic Spine;  Laterality: N/A;    ALLAN'S NEUROMA EXCISION Bilateral     TRIGGER FINGER RELEASE Left          FAMILY HISTORY  Family History   Problem Relation Age of Onset    Malig Hyperthermia Neg Hx          SOCIAL HISTORY  Social History     Socioeconomic History    Marital status:    Tobacco Use    Smoking status: Never    Smokeless tobacco: Never   Vaping Use    Vaping status: Never Used   Substance and Sexual Activity    Alcohol use: Yes     Alcohol/week: 2.0 standard drinks of alcohol     Types: 2 Glasses of wine per week     Comment: OCC    Drug use: Never    Sexual activity: Defer         ALLERGIES  Patient has no known allergies.      REVIEW OF SYSTEMS  Review of Systems   Constitutional:  Negative for fever.   Respiratory:  Negative for shortness of breath.    Cardiovascular:  Negative for chest pain.   Gastrointestinal:  Positive for abdominal pain (Mild abdominal cramping) and blood in stool.   All other systems reviewed and are negative.    Included in HPI  All systems reviewed and negative except for those discussed in HPI.      PHYSICAL EXAM    I have reviewed the triage  vital signs and nursing notes.    ED Triage Vitals   Temp Heart Rate Resp BP SpO2   11/26/24 1103 11/26/24 1103 11/26/24 1106 11/26/24 1106 11/26/24 1103   99 °F (37.2 °C) 104 18 149/81 98 %      Temp src Heart Rate Source Patient Position BP Location FiO2 (%)   -- -- -- -- --              Physical Exam  GENERAL: Alert well-appearing female no obvious distress.  Triage vitals reviewed no for temperature 99.  Pulse 104.  Blood pressure 149/81.  O2 sats 98% on room air  SKIN: Warm, dry  HENT: Normocephalic, atraumatic  EYES: no scleral icterus  CV: regular rhythm, regular rate-no murmur  RESPIRATORY: normal effort, lungs clear  ABDOMEN: soft, nontender, nondistended  MUSCULOSKELETAL: no deformity  NEURO: alert, moves all extremities, follows commands      LAB RESULTS  Recent Results (from the past 24 hours)   Comprehensive Metabolic Panel    Collection Time: 11/26/24 11:13 AM    Specimen: Arm, Right; Blood   Result Value Ref Range    Glucose 136 (H) 65 - 99 mg/dL    BUN 12 8 - 23 mg/dL    Creatinine 0.70 0.57 - 1.00 mg/dL    Sodium 141 136 - 145 mmol/L    Potassium 4.1 3.5 - 5.2 mmol/L    Chloride 104 98 - 107 mmol/L    CO2 24.7 22.0 - 29.0 mmol/L    Calcium 9.4 8.6 - 10.5 mg/dL    Total Protein 7.4 6.0 - 8.5 g/dL    Albumin 4.4 3.5 - 5.2 g/dL    ALT (SGPT) 16 1 - 33 U/L    AST (SGOT) 20 1 - 32 U/L    Alkaline Phosphatase 83 39 - 117 U/L    Total Bilirubin 0.5 0.0 - 1.2 mg/dL    Globulin 3.0 gm/dL    A/G Ratio 1.5 g/dL    BUN/Creatinine Ratio 17.1 7.0 - 25.0    Anion Gap 12.3 5.0 - 15.0 mmol/L    eGFR 94.3 >60.0 mL/min/1.73   Type & Screen    Collection Time: 11/26/24 11:13 AM    Specimen: Blood   Result Value Ref Range    ABO Type O     RH type Positive     Antibody Screen Negative     T&S Expiration Date 11/29/2024 11:59:59 PM    Lactic Acid, Plasma    Collection Time: 11/26/24 11:13 AM    Specimen: Arm, Right; Blood   Result Value Ref Range    Lactate 1.6 0.5 - 2.0 mmol/L   Green Top (Gel)    Collection Time:  11/26/24 11:13 AM   Result Value Ref Range    Extra Tube Hold for add-ons.    Lavender Top    Collection Time: 11/26/24 11:13 AM   Result Value Ref Range    Extra Tube hold for add-on    Gold Top - SST    Collection Time: 11/26/24 11:13 AM   Result Value Ref Range    Extra Tube Hold for add-ons.    Light Blue Top    Collection Time: 11/26/24 11:13 AM   Result Value Ref Range    Extra Tube Hold for add-ons.    CBC Auto Differential    Collection Time: 11/26/24 11:13 AM    Specimen: Arm, Right; Blood   Result Value Ref Range    WBC 9.04 3.40 - 10.80 10*3/mm3    RBC 4.54 3.77 - 5.28 10*6/mm3    Hemoglobin 13.6 12.0 - 15.9 g/dL    Hematocrit 40.6 34.0 - 46.6 %    MCV 89.4 79.0 - 97.0 fL    MCH 30.0 26.6 - 33.0 pg    MCHC 33.5 31.5 - 35.7 g/dL    RDW 13.3 12.3 - 15.4 %    RDW-SD 43.2 37.0 - 54.0 fl    MPV 10.1 6.0 - 12.0 fL    Platelets 259 140 - 450 10*3/mm3    Neutrophil % 75.5 42.7 - 76.0 %    Lymphocyte % 15.7 (L) 19.6 - 45.3 %    Monocyte % 7.9 5.0 - 12.0 %    Eosinophil % 0.3 0.3 - 6.2 %    Basophil % 0.4 0.0 - 1.5 %    Immature Grans % 0.2 0.0 - 0.5 %    Neutrophils, Absolute 6.82 1.70 - 7.00 10*3/mm3    Lymphocytes, Absolute 1.42 0.70 - 3.10 10*3/mm3    Monocytes, Absolute 0.71 0.10 - 0.90 10*3/mm3    Eosinophils, Absolute 0.03 0.00 - 0.40 10*3/mm3    Basophils, Absolute 0.04 0.00 - 0.20 10*3/mm3    Immature Grans, Absolute 0.02 0.00 - 0.05 10*3/mm3    nRBC 0.0 0.0 - 0.2 /100 WBC   Occult Blood X 1, Stool - Stool, Per Rectum    Collection Time: 11/26/24  1:19 PM    Specimen: Per Rectum; Stool   Result Value Ref Range    Fecal Occult Blood Positive (A) Negative   Gastrointestinal Panel, PCR - Stool, Per Rectum    Collection Time: 11/26/24  1:19 PM    Specimen: Per Rectum; Stool   Result Value Ref Range    Campylobacter Not Detected Not Detected    Plesiomonas shigelloides Not Detected Not Detected    Salmonella Not Detected Not Detected    Vibrio Not Detected Not Detected    Vibrio cholerae Not Detected Not  Detected    Yersinia enterocolitica Not Detected Not Detected    Enteroaggregative E. coli (EAEC) Not Detected Not Detected    Enteropathogenic E. coli (EPEC) Not Detected Not Detected    Enterotoxigenic E. coli (ETEC) lt/st Not Detected Not Detected    Shiga-like toxin-producing E. coli (STEC) stx1/stx2 Not Detected Not Detected    Shigella/Enteroinvasive E. coli (EIEC) Not Detected Not Detected    Cryptosporidium Not Detected Not Detected    Cyclospora cayetanensis Not Detected Not Detected    Entamoeba histolytica Not Detected Not Detected    Giardia lamblia Not Detected Not Detected    Adenovirus F40/41 Not Detected Not Detected    Astrovirus Not Detected Not Detected    Norovirus GI/GII Not Detected Not Detected    Rotavirus A Not Detected Not Detected    Sapovirus (I, II, IV or V) Not Detected Not Detected         RADIOLOGY  No Radiology Exams Resulted Within Past 24 Hours      MEDICATIONS GIVEN IN ER  Medications   sodium chloride 0.9 % flush 10 mL (has no administration in time range)   sodium chloride 0.9 % bolus 500 mL (0 mL Intravenous Stopped 11/26/24 1300)         ORDERS PLACED DURING THIS VISIT:  Orders Placed This Encounter   Procedures    Gastrointestinal Panel, PCR - Stool, Per Rectum    [facility] Draw    Comprehensive Metabolic Panel    Lactic Acid, Plasma    Occult Blood X 1, Stool - Stool, Per Rectum    CBC Auto Differential    NPO Diet NPO Type: Strict NPO    Undress & Gown    Measure Blood Pressure    Vital Signs    Orthostatic Blood Pressure    Pulse Oximetry    Oxygen Therapy- Nasal Cannula; Titrate 1-6 LPM Per SpO2; 90 - 95%    POC Occult Blood Stool    Type & Screen    Insert Peripheral IV    CBC & Differential    Green Top (Gel)    Lavender Top    Gold Top - SST    Light Blue Top         OUTPATIENT MEDICATION MANAGEMENT:  Current Facility-Administered Medications Ordered in Epic   Medication Dose Route Frequency Provider Last Rate Last Admin    sodium chloride 0.9 % flush 10 mL  10 mL  Intravenous PRN Lauro Massey MD         Current Outpatient Medications Ordered in Epic   Medication Sig Dispense Refill    acetaminophen (TYLENOL) 500 MG tablet Take 2 tablets by mouth Every 6 (Six) Hours As Needed for Mild Pain.      Calcium 250 MG capsule Take 1 tablet by mouth Daily.      Cholecalciferol 25 MCG (1000 UT) tablet Take 1 tablet by mouth Daily.      cyclobenzaprine (FLEXERIL) 10 MG tablet Take 1 tablet by mouth 3 (Three) Times a Day As Needed for Muscle Spasms. (Patient not taking: Reported on 11/26/2024) 60 tablet 0    docusate sodium (Colace) 100 MG capsule Take 1 capsule by mouth 2 (Two) Times a Day As Needed for Constipation. 40 capsule     multivitamin with minerals (MULTIVITAMIN ADULT PO) Take 1 tablet by mouth Daily. (Patient not taking: Reported on 11/26/2024)      oxyCODONE (ROXICODONE) 5 MG immediate release tablet Take 1 tablet by mouth Every 4 (Four) Hours As Needed for Moderate Pain. (Patient not taking: Reported on 11/26/2024) 60 tablet 0         PROCEDURES  Procedures            PROGRESS, DATA ANALYSIS, CONSULTS, AND MEDICAL DECISION MAKING  All labs have been independently interpreted by me.  All radiology studies have been reviewed by me. All EKG's have been independently viewed and interpreted by me.  Discussion below represents my analysis of pertinent findings related to patient's condition, differential diagnosis, treatment plan and final disposition.    Differential diagnosis includes but is not limited to gastroenteritis, infectious colitis, anemia, electrolyte disturbance.      ED Course as of 11/26/24 1507   Tue Nov 26, 2024   1228 Patient well-appearing without significant abdominal tenderness to palpation.  Will go ahead and give some IV fluids.  Will give some water and crackers and see if we can collect a stool specimen for PCR testing. [DB]   1500 Stool PCR negative for tested pathogens. [DB]   1504 I went back to reassess the patient and she remains well-appearing  without significant abdominal tenderness.  She has had very little diarrhea here in the department over 4 hours.    We discussed possibility of observation admission for further evaluation.  Labs, exam benign and conservative care with supportive care.  Patient has been worked up with this plan for discharge home with supportive care, return for worsening [DB]      ED Course User Index  [DB] Lauro Massey MD             AS OF 15:07 EST VITALS:    BP - 159/63  HR - 102  TEMP - 99 °F (37.2 °C)  O2 SATS - 96%    COMPLEXITY OF CARE  69-year-old female with history of prior spinal surgery presents with abdominal cramping, bloody diarrhea.    Please see ED course above for my independent evaluation and interpretation of ED testing.    ED laboratory analysis notable for benign white blood count, red blood count and chemistries.  No evidence of hepatic or renal failure.  Stool PCR testing negative for tested pathogens.    I did observe the patient in the ED for greater than 4 hours and she did not have significant amount of diarrhea or bleeding.    We did discuss and consider potential for observation admission but given her benign appearance and labs patient comfortable going home and I think that this is not unreasonable.  Etiology of diarrhea unclear but suspect likely foodborne or viral in nature.      DIAGNOSIS  Final diagnoses:   Bloody diarrhea         DISPOSITION  ED Disposition       ED Disposition   Discharge    Condition   Stable    Comment   --                Please note that portions of this document were completed with a voice recognition program.    Note Disclaimer: At [facility], we believe that sharing information builds trust and better relationships. You are receiving this note because you recently visited [facility]. It is possible you will see health information before a provider has talked with you about it. This kind of information can be easy to misunderstand. To help you fully understand  what it means for your health, we urge you to discuss this note with your provider.         Lauro Massey MD  11/26/24 8671

## 2025-05-28 ENCOUNTER — APPOINTMENT (OUTPATIENT)
Dept: WOMENS IMAGING | Facility: HOSPITAL | Age: 70
End: 2025-05-28
Payer: MEDICARE

## 2025-05-28 PROCEDURE — 77067 SCR MAMMO BI INCL CAD: CPT | Performed by: RADIOLOGY

## 2025-05-28 PROCEDURE — 77063 BREAST TOMOSYNTHESIS BI: CPT | Performed by: RADIOLOGY

## 2025-06-12 ENCOUNTER — APPOINTMENT (OUTPATIENT)
Dept: WOMENS IMAGING | Facility: HOSPITAL | Age: 70
End: 2025-06-12
Payer: MEDICARE

## 2025-06-12 PROCEDURE — G0279 TOMOSYNTHESIS, MAMMO: HCPCS | Performed by: RADIOLOGY

## 2025-06-12 PROCEDURE — 77065 DX MAMMO INCL CAD UNI: CPT | Performed by: RADIOLOGY

## 2025-06-26 ENCOUNTER — LAB REQUISITION (OUTPATIENT)
Dept: LAB | Facility: HOSPITAL | Age: 70
End: 2025-06-26
Payer: MEDICARE

## 2025-06-26 ENCOUNTER — APPOINTMENT (OUTPATIENT)
Dept: WOMENS IMAGING | Facility: HOSPITAL | Age: 70
End: 2025-06-26
Payer: MEDICARE

## 2025-06-26 DIAGNOSIS — R92.1 MAMMOGRAPHIC CALCIFICATION FOUND ON DIAGNOSTIC IMAGING OF BREAST: ICD-10-CM

## 2025-06-26 PROCEDURE — 88341 IMHCHEM/IMCYTCHM EA ADD ANTB: CPT | Performed by: INTERNAL MEDICINE

## 2025-06-26 PROCEDURE — A4648 IMPLANTABLE TISSUE MARKER: HCPCS | Performed by: RADIOLOGY

## 2025-06-26 PROCEDURE — 19081 BX BREAST 1ST LESION STRTCTC: CPT | Performed by: RADIOLOGY

## 2025-06-26 PROCEDURE — 88305 TISSUE EXAM BY PATHOLOGIST: CPT | Performed by: INTERNAL MEDICINE

## 2025-06-26 PROCEDURE — 88342 IMHCHEM/IMCYTCHM 1ST ANTB: CPT | Performed by: INTERNAL MEDICINE

## 2025-06-26 PROCEDURE — C1819 TISSUE LOCALIZATION-EXCISION: HCPCS | Performed by: RADIOLOGY

## 2025-07-02 LAB
CYTO UR: NORMAL
DX PRELIMINARY: NORMAL
LAB AP CASE REPORT: NORMAL
LAB AP CLINICAL INFORMATION: NORMAL
LAB AP INTRADEPARTMENTAL CONSULT: NORMAL
LAB AP SPECIAL STAINS: NORMAL
PATH REPORT.FINAL DX SPEC: NORMAL
PATH REPORT.GROSS SPEC: NORMAL

## 2025-07-03 ENCOUNTER — TELEPHONE (OUTPATIENT)
Dept: SURGERY | Facility: CLINIC | Age: 70
End: 2025-07-03
Payer: MEDICARE

## 2025-07-03 NOTE — TELEPHONE ENCOUNTER
Attempted to call to ask breast intake questions prior to office visit. Left voicemail to call back.

## 2025-07-14 ENCOUNTER — LAB (OUTPATIENT)
Dept: LAB | Facility: HOSPITAL | Age: 70
End: 2025-07-14
Payer: MEDICARE

## 2025-07-14 ENCOUNTER — HOSPITAL ENCOUNTER (OUTPATIENT)
Dept: CARDIOLOGY | Facility: HOSPITAL | Age: 70
Discharge: HOME | End: 2025-07-14
Payer: MEDICARE

## 2025-07-14 ENCOUNTER — TRANSCRIBE ORDERS (OUTPATIENT)
Dept: ADMINISTRATIVE | Facility: HOSPITAL | Age: 70
End: 2025-07-14
Payer: MEDICARE

## 2025-07-14 DIAGNOSIS — M17.11 ARTHRITIS OF RIGHT KNEE: ICD-10-CM

## 2025-07-14 DIAGNOSIS — M17.11 ARTHRITIS OF RIGHT KNEE: Primary | ICD-10-CM

## 2025-07-14 LAB
ANION GAP SERPL CALCULATED.3IONS-SCNC: 8.5 MMOL/L (ref 5–15)
BASOPHILS # BLD AUTO: 0.07 10*3/MM3 (ref 0–0.2)
BASOPHILS NFR BLD AUTO: 1 % (ref 0–1.5)
BUN SERPL-MCNC: 15 MG/DL (ref 8–23)
BUN/CREAT SERPL: 23.1 (ref 7–25)
CALCIUM SPEC-SCNC: 9.4 MG/DL (ref 8.6–10.5)
CHLORIDE SERPL-SCNC: 104 MMOL/L (ref 98–107)
CO2 SERPL-SCNC: 26.5 MMOL/L (ref 22–29)
CREAT SERPL-MCNC: 0.65 MG/DL (ref 0.57–1)
DEPRECATED RDW RBC AUTO: 40.8 FL (ref 37–54)
EGFRCR SERPLBLD CKD-EPI 2021: 95.4 ML/MIN/1.73
EOSINOPHIL # BLD AUTO: 0.12 10*3/MM3 (ref 0–0.4)
EOSINOPHIL NFR BLD AUTO: 1.8 % (ref 0.3–6.2)
ERYTHROCYTE [DISTWIDTH] IN BLOOD BY AUTOMATED COUNT: 12.5 % (ref 12.3–15.4)
GLUCOSE SERPL-MCNC: 106 MG/DL (ref 65–99)
HBA1C MFR BLD: 5.6 % (ref 4.8–5.6)
HCT VFR BLD AUTO: 40.2 % (ref 34–46.6)
HGB BLD-MCNC: 13.3 G/DL (ref 12–15.9)
IMM GRANULOCYTES # BLD AUTO: 0.02 10*3/MM3 (ref 0–0.05)
IMM GRANULOCYTES NFR BLD AUTO: 0.3 % (ref 0–0.5)
LYMPHOCYTES # BLD AUTO: 1.86 10*3/MM3 (ref 0.7–3.1)
LYMPHOCYTES NFR BLD AUTO: 27.5 % (ref 19.6–45.3)
MCH RBC QN AUTO: 30 PG (ref 26.6–33)
MCHC RBC AUTO-ENTMCNC: 33.1 G/DL (ref 31.5–35.7)
MCV RBC AUTO: 90.5 FL (ref 79–97)
MONOCYTES # BLD AUTO: 0.78 10*3/MM3 (ref 0.1–0.9)
MONOCYTES NFR BLD AUTO: 11.5 % (ref 5–12)
NEUTROPHILS NFR BLD AUTO: 3.91 10*3/MM3 (ref 1.7–7)
NEUTROPHILS NFR BLD AUTO: 57.9 % (ref 42.7–76)
NRBC BLD AUTO-RTO: 0 /100 WBC (ref 0–0.2)
PLATELET # BLD AUTO: 257 10*3/MM3 (ref 140–450)
PMV BLD AUTO: 9.9 FL (ref 6–12)
POTASSIUM SERPL-SCNC: 4.4 MMOL/L (ref 3.5–5.2)
RBC # BLD AUTO: 4.44 10*6/MM3 (ref 3.77–5.28)
SODIUM SERPL-SCNC: 139 MMOL/L (ref 136–145)
WBC NRBC COR # BLD AUTO: 6.76 10*3/MM3 (ref 3.4–10.8)

## 2025-07-14 PROCEDURE — 93005 ELECTROCARDIOGRAM TRACING: CPT | Performed by: ORTHOPAEDIC SURGERY

## 2025-07-14 PROCEDURE — 83036 HEMOGLOBIN GLYCOSYLATED A1C: CPT

## 2025-07-14 PROCEDURE — 82985 ASSAY OF GLYCATED PROTEIN: CPT

## 2025-07-14 PROCEDURE — 85025 COMPLETE CBC W/AUTO DIFF WBC: CPT

## 2025-07-14 PROCEDURE — 80048 BASIC METABOLIC PNL TOTAL CA: CPT

## 2025-07-14 PROCEDURE — 36415 COLL VENOUS BLD VENIPUNCTURE: CPT

## 2025-07-15 LAB
FRUCTOSAMINE SERPL-SCNC: 217 UMOL/L (ref 0–285)
QT INTERVAL: 455 MS
QTC INTERVAL: 456 MS

## 2025-08-11 ENCOUNTER — OFFICE VISIT (OUTPATIENT)
Dept: SURGERY | Facility: CLINIC | Age: 70
End: 2025-08-11
Payer: MEDICARE

## 2025-08-11 VITALS
SYSTOLIC BLOOD PRESSURE: 154 MMHG | OXYGEN SATURATION: 99 % | WEIGHT: 162 LBS | HEIGHT: 62 IN | BODY MASS INDEX: 29.81 KG/M2 | DIASTOLIC BLOOD PRESSURE: 68 MMHG | RESPIRATION RATE: 12 BRPM | HEART RATE: 58 BPM

## 2025-08-11 DIAGNOSIS — Z91.89 AT HIGH RISK FOR BREAST CANCER: Primary | ICD-10-CM

## 2025-08-11 DIAGNOSIS — R92.8 OTHER ABNORMAL AND INCONCLUSIVE FINDINGS ON DIAGNOSTIC IMAGING OF BREAST: ICD-10-CM

## 2025-08-11 PROCEDURE — 1159F MED LIST DOCD IN RCRD: CPT | Performed by: STUDENT IN AN ORGANIZED HEALTH CARE EDUCATION/TRAINING PROGRAM

## 2025-08-11 PROCEDURE — 1160F RVW MEDS BY RX/DR IN RCRD: CPT | Performed by: STUDENT IN AN ORGANIZED HEALTH CARE EDUCATION/TRAINING PROGRAM

## 2025-08-11 PROCEDURE — G2211 COMPLEX E/M VISIT ADD ON: HCPCS | Performed by: STUDENT IN AN ORGANIZED HEALTH CARE EDUCATION/TRAINING PROGRAM

## 2025-08-11 PROCEDURE — 99213 OFFICE O/P EST LOW 20 MIN: CPT | Performed by: STUDENT IN AN ORGANIZED HEALTH CARE EDUCATION/TRAINING PROGRAM

## 2025-08-11 RX ORDER — MUPIROCIN 2 %
OINTMENT (GRAM) TOPICAL
COMMUNITY
Start: 2025-05-21

## 2025-08-11 RX ORDER — MELOXICAM 10 MG/1
CAPSULE ORAL
COMMUNITY
Start: 2025-04-21

## (undated) DEVICE — SUT MNCRYL 5/0 P3 18 IN Y493G

## (undated) DEVICE — SPNG LAP PREWSH SFTPK 18X18IN STRL PK/5

## (undated) DEVICE — MARKR SKIN W/RULR AND LBL

## (undated) DEVICE — SOL OPTHALMIC BETADINE 5P 30ML STRL

## (undated) DEVICE — PIN, 9733235, 100MM, STERILE, PERC REF

## (undated) DEVICE — ELECTRD BLD EZ CLN MOD XLNG 2.75IN

## (undated) DEVICE — STPLR SKIN VISISTAT WD 35CT

## (undated) DEVICE — DRP MICROSCP LEICA 137X381CM

## (undated) DEVICE — ANTIBACTERIAL UNDYED BRAIDED (POLYGLACTIN 910), SYNTHETIC ABSORBABLE SUTURE: Brand: COATED VICRYL

## (undated) DEVICE — Device

## (undated) DEVICE — SHEET,DRAPE,53X77,STERILE: Brand: MEDLINE

## (undated) DEVICE — ELECTRD NDL EZ CLN MOD 2.75IN

## (undated) DEVICE — LN SMPL CO2 SHTRM SD STREAM W/M LUER

## (undated) DEVICE — KT ORCA ORCAPOD DISP STRL

## (undated) DEVICE — PAD,ABDOMINAL,8"X10",ST,LF: Brand: MEDLINE

## (undated) DEVICE — TOTAL TRAY, 16FR 10ML SIL FOLEY, URN: Brand: MEDLINE

## (undated) DEVICE — STRIP,CLOSURE,WOUND,MEDI-STRIP,1/2X4: Brand: MEDLINE

## (undated) DEVICE — GLV SURG SENSICARE PI MIC PF SZ8 LF STRL

## (undated) DEVICE — DRSNG WND GZ PAD BORDERED 4X8IN STRL

## (undated) DEVICE — SUT ETHLN 3/0 PS1 18IN 1663H

## (undated) DEVICE — INTENDED FOR TISSUE SEPARATION, AND OTHER PROCEDURES THAT REQUIRE A SHARP SURGICAL BLADE TO PUNCTURE OR CUT.: Brand: BARD-PARKER ® STAINLESS STEEL BLADES

## (undated) DEVICE — PK BASIC SPINE 50

## (undated) DEVICE — ADAPT CLN BIOGUARD AIR/H2O DISP

## (undated) DEVICE — NDL HYPO PRECISIONGLIDE/REG 30G 1/2 BRN

## (undated) DEVICE — MARKR SKIN W/RULR ULTRAFINETP

## (undated) DEVICE — PREP SOL POVIDONE/IODINE BT 4OZ

## (undated) DEVICE — SUT PROLN 5/0 P3 18IN 8698G

## (undated) DEVICE — ADHS SKIN SURG TISS VISC PREMIERPRO EXOFIN HI/VISC FAST/DRY

## (undated) DEVICE — SPNG LAP 18X18IN LF STRL PK/5

## (undated) DEVICE — SMOKE EVACUATION TUBING WITH 7/8 IN TO 1/4 IN REDUCER: Brand: BUFFALO FILTER

## (undated) DEVICE — SUT GUT PLN FAST ABS 5/0 PC1 18IN 1915G

## (undated) DEVICE — SUT SILK 5/0 P3 18IN 640G

## (undated) DEVICE — YANKAUER,BULB TIP,W/O VENT,RIGID,STERILE: Brand: MEDLINE

## (undated) DEVICE — SUT MNCRYL 3/0 PS2 18IN MCP497G

## (undated) DEVICE — GLV SURG PREMIERPRO ORTHO LTX PF SZ8 BRN

## (undated) DEVICE — SPHR MARKR STEALTH STATION

## (undated) DEVICE — PATIENT RETURN ELECTRODE, SINGLE-USE, CONTACT QUALITY MONITORING, ADULT, WITH 9FT CORD, FOR PATIENTS WEIGING OVER 33LBS. (15KG): Brand: MEGADYNE

## (undated) DEVICE — NDL HYPO ECLPS SFTY 22G 1 1/2IN

## (undated) DEVICE — SYR LUERLOK 30CC

## (undated) DEVICE — INTENDED FOR TISSUE SEPARATION, AND OTHER PROCEDURES THAT REQUIRE A SHARP SURGICAL BLADE TO PUNCTURE OR CUT.: Brand: BARD-PARKER ® CARBON RIB-BACK BLADES

## (undated) DEVICE — DRP C/ARMOR

## (undated) DEVICE — SYS ENSING FLUID M/ ADD MAC1001

## (undated) DEVICE — PREMIUM DRY TRAY LF: Brand: MEDLINE INDUSTRIES, INC.

## (undated) DEVICE — JACKSON-PRATT 100CC BULB RESERVOIR: Brand: CARDINAL HEALTH

## (undated) DEVICE — SKIN PREP TRAY W/CHG: Brand: MEDLINE INDUSTRIES, INC.

## (undated) DEVICE — SPNG GZ WOVN 4X4IN 12PLY 10/BX STRL

## (undated) DEVICE — GLV SURG PREMIERPRO ORTHO LTX PF SZ8.5 BRN

## (undated) DEVICE — 1010 S-DRAPE TOWEL DRAPE 10/BX: Brand: STERI-DRAPE™

## (undated) DEVICE — PENCL E/S HNDSWTCH SMOKEEVAC HOLSTR 10FT

## (undated) DEVICE — STERILE COTTON TIP 6IN 10PK: Brand: MEDLINE

## (undated) DEVICE — SENSR O2 OXIMAX FNGR A/ 18IN NONSTR

## (undated) DEVICE — SUT PDS 4/0 PS2 18IN CLR Z496G

## (undated) DEVICE — NEEDLE, QUINCKE, 20GX3.5": Brand: MEDLINE

## (undated) DEVICE — TUBING, SUCTION, 1/4" X 12', STRAIGHT: Brand: MEDLINE

## (undated) DEVICE — GOWN,NON-REINFORCED,SIRUS,SET IN SLV,XL: Brand: MEDLINE

## (undated) DEVICE — PK UNIV COMPL 40

## (undated) DEVICE — INSTRUMENT 8670001 SXT GUIDEWIRE BLUNT

## (undated) DEVICE — TUBING, SUCTION, 1/4" X 10', STRAIGHT: Brand: MEDLINE

## (undated) DEVICE — 3M™ STERI-STRIP™ REINFORCED ADHESIVE SKIN CLOSURES, R1547, 1/2 IN X 4 IN (12 MM X 100 MM), 6 STRIPS/ENVELOPE: Brand: 3M™ STERI-STRIP™

## (undated) DEVICE — CONTAINER,SPECIMEN,OR STERILE,4OZ: Brand: MEDLINE

## (undated) DEVICE — TRAP FLD MINIVAC MEGADYNE 100ML

## (undated) DEVICE — PENCL E/S HNDSWCH PUSHBTN HOLSTR 10FT

## (undated) DEVICE — SYR LUERLOK 20CC BX/50

## (undated) DEVICE — PK ENT MINOR 40

## (undated) DEVICE — TOOL MR8-14MH30 MR8 14CM MATCH 3MM: Brand: MIDAS REX MR8

## (undated) DEVICE — ELECTRD BLD EZ CLN STD 6.5IN

## (undated) DEVICE — UNDYED MONOFILAMENT (POLYDIOXANONE), ABSORBABLE SYNTHETIC SURGICAL SUTURE: Brand: PDS

## (undated) DEVICE — CANN O2 ETCO2 FITS ALL CONN CO2 SMPL A/ 7IN DISP LF